# Patient Record
Sex: MALE | Race: WHITE | URBAN - METROPOLITAN AREA
[De-identification: names, ages, dates, MRNs, and addresses within clinical notes are randomized per-mention and may not be internally consistent; named-entity substitution may affect disease eponyms.]

---

## 2024-02-13 ENCOUNTER — OFFICE VISIT (OUTPATIENT)
Dept: DERMATOLOGY CLINIC | Facility: CLINIC | Age: 69
End: 2024-02-13

## 2024-02-13 DIAGNOSIS — D49.2 NEOPLASM OF UNSPECIFIED BEHAVIOR OF BONE, SOFT TISSUE, AND SKIN: ICD-10-CM

## 2024-02-13 DIAGNOSIS — L72.9 CYST OF SKIN: Primary | ICD-10-CM

## 2024-02-13 PROCEDURE — 88305 TISSUE EXAM BY PATHOLOGIST: CPT | Performed by: STUDENT IN AN ORGANIZED HEALTH CARE EDUCATION/TRAINING PROGRAM

## 2024-02-13 PROCEDURE — 11102 TANGNTL BX SKIN SINGLE LES: CPT | Performed by: STUDENT IN AN ORGANIZED HEALTH CARE EDUCATION/TRAINING PROGRAM

## 2024-02-13 PROCEDURE — 99202 OFFICE O/P NEW SF 15 MIN: CPT | Performed by: STUDENT IN AN ORGANIZED HEALTH CARE EDUCATION/TRAINING PROGRAM

## 2024-02-13 RX ORDER — TAMSULOSIN HYDROCHLORIDE 0.4 MG/1
0.4 CAPSULE ORAL
COMMUNITY

## 2024-02-13 RX ORDER — RUFINAMIDE 40 MG/ML
1 SUSPENSION ORAL DAILY
COMMUNITY

## 2024-02-13 NOTE — PROGRESS NOTES
New Patient    Referred by: No referring provider defined for this encounter.    CHIEF COMPLAINT: Spot of concern    HISTORY OF PRESENT ILLNESS: William Dhillon is a 69 year old male here for evaluation of spot of concern.    Spot of concern  Location: Left hip  Duration: 6 months  Signs and symptoms: started off itchy then turned into a bump  Current treatment: None      2. Spot of concern  Location: Left wrist   Duration: 35 + yrs  Signs and symptoms: started as a blemish and now open wound/bleeds then becomes thick scab  Current treatment: Aloe lotion      Personal Dermatologic History  History of skin cancer: No  History of  atypical moles: No    FAMILY HISTORY:  History of melanoma: No    Past Medical History  No past medical history on file.    REVIEW OF SYSTEMS:  Constitutional: Denies fever, chills, unintentional weight loss.   Skin as per HPI    Medications  No current outpatient medications on file.       PHYSICAL EXAM:  General: awake, alert, no acute distress  Neuropsych: appropriate mood and affect  Eyes: Sclerae anicteric, without conjunctival injection, eyelids unremarkable  Skin: Skin exam was performed today including the following: wrist and hip. Pertinent findings include:   - L lateral hip with cystic nodule  - L wrist with ulcerated nodule    ASSESSMENT & PLAN:  Pathophysiology of diagnoses discussed with patient.  Therapeutic options reviewed. Risks, benefits, and alternatives discussed with patient. Instructions reviewed at length.    #Neoplasm(s) of uncertain behavior of skin  - Shave biopsy performed today   - Will notify patient with results and arrange for appropriate definitive treatment, if indicated.      Shave of lesion to establish and confirm diagnosis:  Photo taken: Yes    Risks, benefits, alternatives and personnel required for shave biopsy reviewed with patient. Risks discussed include, but not limited to: pain, bleeding, infection, scar, reaction to anesthetic, and recurrence/need  for further treatment.  Patient and physician agree as to site(s) to be biopsied. Patient verbalizes understanding and wishes to proceed.     Site(s) prepped with alcohol and anesthetized with 1% lidocaine with epinephrine.   Shave of lesion(s) performed to the level of the dermis. Specimen(s) from A. L wrist  sent for pathology to r/o BCC 50% ALCL and bandaging applied.   Written and verbal wound care instructions provided to patient, understanding verbalized.    #Cyst, L hip  The note was routed electronically to the referring physician.     Return to clinic: 1 month or sooner if something concerning arises     Jesse Anderson MD

## 2024-02-19 ENCOUNTER — TELEPHONE (OUTPATIENT)
Dept: DERMATOLOGY CLINIC | Facility: CLINIC | Age: 69
End: 2024-02-19

## 2024-02-20 NOTE — TELEPHONE ENCOUNTER
Dr. Anderson - pt seen 2/13/23, biopsy done to left wrist - benign keratosis, pt requests additional treatment even though it's benign, states lesion is scabby, falls off, becomes red, raw looking. Wants to know if you can freeze it at some point? States he will proceed with surgeons in [place] to have this treated if we can't offer him any tx here.

## 2024-02-28 ENCOUNTER — OFFICE VISIT (OUTPATIENT)
Dept: DERMATOLOGY CLINIC | Facility: CLINIC | Age: 69
End: 2024-02-28
Payer: MEDICARE

## 2024-02-28 DIAGNOSIS — D48.5 NEOPLASM OF UNCERTAIN BEHAVIOR OF SKIN: Primary | ICD-10-CM

## 2024-02-28 PROCEDURE — 11102 TANGNTL BX SKIN SINGLE LES: CPT | Performed by: STUDENT IN AN ORGANIZED HEALTH CARE EDUCATION/TRAINING PROGRAM

## 2024-02-28 PROCEDURE — 88305 TISSUE EXAM BY PATHOLOGIST: CPT | Performed by: STUDENT IN AN ORGANIZED HEALTH CARE EDUCATION/TRAINING PROGRAM

## 2024-02-28 NOTE — PROGRESS NOTES
Established Patient    Referred by: No referring provider defined for this encounter.    CHIEF COMPLAINT: Lesion of concern     HISTORY OF PRESENT ILLNESS: William Dhillon is a 69 year old male here for evaluation of lesion of concern.    Growth   Location: Left wrist   Duration: 35 + yrs  Signs and symptoms: started as a blemish and now open wound/bleeds then becomes thick scab  Current treatment: Aloe lotion    Personal Dermatologic History  History of skin cancer: No  History of  atypical moles: No    FAMILY HISTORY:  History of melanoma: No    Past Medical History  History reviewed. No pertinent past medical history.    REVIEW OF SYSTEMS:  Constitutional: Denies fever, chills, unintentional weight loss.   Skin as per HPI    Medications  Current Outpatient Medications   Medication Sig Dispense Refill    tamsulosin 0.4 MG Oral Cap Take 1 capsule (0.4 mg total) by mouth After dinner.      multivitamin Oral Chew Tab Chew 1 tablet by mouth daily.         PHYSICAL EXAM:  General: awake, alert, no acute distress  Neuropsych: appropriate mood and affect  Eyes: Sclerae anicteric, without conjunctival injection, eyelids unremarkable  Skin: Skin exam was performed today including the following: L wrist . Pertinent findings include:   - with ulcerated nodule    ASSESSMENT & PLAN:  Pathophysiology of diagnoses discussed with patient.  Therapeutic options reviewed. Risks, benefits, and alternatives discussed with patient. Instructions reviewed at length.    #Neoplasm(s) of uncertain behavior of skin  - Shave biopsy performed today   - Will notify patient with results and arrange for appropriate definitive treatment, if indicated.      Shave of lesion to establish and confirm diagnosis:  Photo taken: Yes    Risks, benefits, alternatives and personnel required for shave biopsy reviewed with patient. Risks discussed include, but not limited to: pain, bleeding, infection, scar, reaction to anesthetic, and recurrence/need for further  treatment.  Patient and physician agree as to site(s) to be biopsied. Patient verbalizes understanding and wishes to proceed.     Site(s) prepped with alcohol and anesthetized with 1% lidocaine with epinephrine.   Shave of lesion(s) performed to the level of the dermis. Specimen(s) from A. L wrist  sent for pathology to r/o BCC 50% ALCL and bandaging applied.   Written and verbal wound care instructions provided to patient, understanding verbalized.    Base curetted and cauterized in case of inflamed keratosis.     Return to clinic: for FBSE or sooner if something concerning arises     Jesse Anderson MD

## 2024-03-04 ENCOUNTER — TELEPHONE (OUTPATIENT)
Dept: DERMATOLOGY CLINIC | Facility: CLINIC | Age: 69
End: 2024-03-04

## 2024-03-04 NOTE — TELEPHONE ENCOUNTER
Called patient. Informed him of results and recommend MOHS surgery with OSD. Patient agreeable with plan.

## 2024-03-22 ENCOUNTER — MED REC SCAN ONLY (OUTPATIENT)
Dept: DERMATOLOGY CLINIC | Facility: CLINIC | Age: 69
End: 2024-03-22

## 2024-03-26 ENCOUNTER — OFFICE VISIT (OUTPATIENT)
Dept: SURGERY | Facility: CLINIC | Age: 69
End: 2024-03-26

## 2024-03-26 DIAGNOSIS — R82.90 URINE FINDING: Primary | ICD-10-CM

## 2024-03-26 DIAGNOSIS — R33.9 URINARY RETENTION: ICD-10-CM

## 2024-03-26 LAB
APPEARANCE: CLEAR
BILIRUBIN: NEGATIVE
GLUCOSE (URINE DIPSTICK): 250 MG/DL
KETONES (URINE DIPSTICK): NEGATIVE MG/DL
MULTISTIX LOT#: ABNORMAL NUMERIC
NITRITE, URINE: POSITIVE
PH, URINE: 6 (ref 4.5–8)
PROTEIN (URINE DIPSTICK): >=300 MG/DL
SPECIFIC GRAVITY: 1.02 (ref 1–1.03)
URINE-COLOR: YELLOW
UROBILINOGEN,SEMI-QN: 0.2 MG/DL (ref 0–1.9)

## 2024-03-26 PROCEDURE — 51798 US URINE CAPACITY MEASURE: CPT | Performed by: SURGERY

## 2024-03-26 PROCEDURE — 81003 URINALYSIS AUTO W/O SCOPE: CPT | Performed by: SURGERY

## 2024-03-26 PROCEDURE — 99204 OFFICE O/P NEW MOD 45 MIN: CPT | Performed by: SURGERY

## 2024-03-26 RX ORDER — SULFAMETHOXAZOLE AND TRIMETHOPRIM 800; 160 MG/1; MG/1
1 TABLET ORAL 2 TIMES DAILY
Qty: 4 TABLET | Refills: 0 | Status: SHIPPED | OUTPATIENT
Start: 2024-03-26 | End: 2024-03-28

## 2024-03-26 RX ORDER — TAMSULOSIN HYDROCHLORIDE 0.4 MG/1
0.8 CAPSULE ORAL EVERY EVENING
Qty: 180 CAPSULE | Refills: 6 | Status: SHIPPED | OUTPATIENT
Start: 2024-03-26

## 2024-03-26 NOTE — PROGRESS NOTES
Urology Clinic Note - New Patient    Referring Provider:  No referring provider defined for this encounter.     Primary Care Provider:  No primary care provider on file.     Chief Complaint:   BPH/LUTS    HPI:   William Dhillon is a 69 year old male with history of OA, hypertension referred for BPH/LUTS.  He was seen by a urologist in [place] back in 2021 and started on tamsulosin for BPH symptoms.    He feels that the tamsulosin helps him and it helps even more when he was on tamsulosin 0.8 mg daily.  He has since run out of his prescription.  He denies bothersome urinary urgency, frequency, gross hematuria.    PVR at prior urology visits around 200 mL.  Up to 335 mL today.    Denies family history of prostate or other cancers.    AUA symptom score is 9/3 with LUTS.    Post-void residual bladder scan: 335 mL    Urinalysis (clinic dip): Glucose 250, large blood, greater than 300 protein, positive nitrates, large leukocyte esterase    PSA:  No results found for: \"PSA\", \"PERCENTPSA\", \"PSAS\", \"PSAULTRA\", \"QPSA\", \"PSATOT\", \"TOTPSADX\", \"TOTPSASCREEN\"     History:   No past medical history on file.    Past Surgical History:   Procedure Laterality Date    CATARACT  2023    COLONOSCOPY  2023    Results were good       No family history on file.    Social History     Socioeconomic History    Marital status:    Tobacco Use    Smoking status: Never    Smokeless tobacco: Never   Substance and Sexual Activity    Alcohol use: Yes     Alcohol/week: 6.0 standard drinks of alcohol     Types: 6 Cans of beer per week     Comment: Number button not working. 6 beers is correct #    Drug use: Never   Other Topics Concern    Grew up on a farm No    History of tanning No    Outdoor occupation No    Reaction to local anesthetic No    Pt has a pacemaker No    Pt has a defibrillator No       Medications (Active prior to today's visit):  Current Outpatient Medications   Medication Sig Dispense Refill    tamsulosin 0.4 MG Oral Cap Take 1  capsule (0.4 mg total) by mouth After dinner.      multivitamin Oral Chew Tab Chew 1 tablet by mouth daily.         Allergies:  Not on File      Review of Systems:   A comprehensive 10-point review of systems was completed.  Pertinent positives and negatives are noted in the the HPI.    Physical Exam:   CONSTITUTIONAL: Well developed, well nourished, in no acute distress  NEUROLOGIC: Alert and oriented  HEAD: Normocephalic, atraumatic  EYES: Sclera non-icteric  ENT: Hearing intact, moist mucous membranes  NECK: No obvious goiter or masses  RESPIRATORY: Normal respiratory effort  SKIN: No evident rashes  ABDOMEN: Soft, non-tender, non-distended  GENITOURINARY: Normal phallus, orthotopic meatus, normal bilateral testicles  DIGITAL RECTAL EXAM: ~50 gram prostate, no nodules or tenderness    Assessment & Plan:   William Dhillon is a 69 year old male with history of OA, hypertension referred for BPH/LUTS.  He was seen by a urologist in [place] back in 2021 and started on tamsulosin for BPH symptoms.    He feels that the tamsulosin helps him and it helps even more when he was on tamsulosin 0.8 mg daily.  He has since run out of his prescription.  He denies bothersome urinary urgency, frequency, gross hematuria.    PVR at prior urology visits around 200 mL.  Up to 335 mL today.    Denies family history of prostate or other cancers.    AUA symptom score is 9/3 with LUTS.    Post-void residual bladder scan: 335 mL    -Restart tamsulosin 0.8 mg daily  -Return to clinic in 2 months for symptom check and repeat PVR  -Can consider adding finasteride and/or cystoscopy if still not emptying and symptoms persist  -Prescribed Bactrim empirically, follow-up urine culture and change antibiotics if necessary for presumed UTI  -PSA at next visit once UTI fully treated      Thank you for this consult.    I have personally reviewed all relevant medical records, labs, and imaging.    Medical Decision Making  BPH/LUTS: Undiagnosed new  problem  Urinary tract infection: Undiagnosed new problem    Amount and/or Complexity of Data Reviewed  External Data Reviewed: labs and notes.  Labs: ordered.    Risk  Prescription drug management.          Kavon Smith MD  Staff Urologist  [facility]  Office: [phone]

## 2024-03-28 RX ORDER — CIPROFLOXACIN 500 MG/1
500 TABLET, FILM COATED ORAL 2 TIMES DAILY
Qty: 14 TABLET | Refills: 0 | Status: SHIPPED | OUTPATIENT
Start: 2024-03-28 | End: 2024-04-04

## 2024-06-03 ENCOUNTER — TELEPHONE (OUTPATIENT)
Dept: SURGERY | Facility: CLINIC | Age: 69
End: 2024-06-03

## 2024-06-03 NOTE — TELEPHONE ENCOUNTER
Patient requesting a refill of the tamsulosin medication. He had enough until his 6/25 appointment but he had to reschedule this appointment to 7/26. Patient will need a refill to have enough medication till this new appointment. Please advise.    Current Outpatient Medications:     tamsulosin 0.4 MG Oral Cap, Take 2 capsules (0.8 mg total) by mouth every evening., Disp: 180

## 2024-08-27 ENCOUNTER — HOSPITAL ENCOUNTER (OUTPATIENT)
Dept: ULTRASOUND IMAGING | Age: 69
Discharge: HOME | End: 2024-08-27
Attending: SURGERY
Payer: MEDICARE

## 2024-08-27 DIAGNOSIS — R33.9 URINARY RETENTION: ICD-10-CM

## 2024-08-27 PROCEDURE — 76770 US EXAM ABDO BACK WALL COMP: CPT | Performed by: SURGERY

## 2024-08-28 NOTE — PROGRESS NOTES
Renal US 8/27/24 shows marked right hydronephrosis with cortical thinning, left extrarenal pelvis, no stones, bladder  cc. Prostate volume 74.2 cc.    Future Appointments  9/10/2024  11:45 AM   Kavon Smith MD           [id number]           [facility]

## 2024-09-03 ENCOUNTER — TELEPHONE (OUTPATIENT)
Dept: SURGERY | Facility: CLINIC | Age: 69
End: 2024-09-03

## 2024-09-03 DIAGNOSIS — N40.1 BPH LOC W URIN OBS/LUTS: Primary | ICD-10-CM

## 2024-09-03 DIAGNOSIS — N13.39 OTHER HYDRONEPHROSIS: ICD-10-CM

## 2024-09-03 NOTE — TELEPHONE ENCOUNTER
Patient has follow up appointment scheduled 9/10. Patient asking if any further test our required based on his ultra sound results. Please send reply through patients mychart, thanks.

## 2024-09-04 NOTE — TELEPHONE ENCOUNTER
RN called patient. Concern of his US result and wondering if MD will be ordering or is there a need for a CT imaging. He has appt on 9/10 and due to distance, he wishes MD to give order now (a need of CT?) before his appointment. Ok to discuss result on appt day.

## 2024-09-04 NOTE — TELEPHONE ENCOUNTER
This encounter is now closed.     RN called patient and updated re: CT order and to call Central Scheduling  Patient thankful and agreed to plans.

## 2024-09-23 ENCOUNTER — LAB ENCOUNTER (OUTPATIENT)
Dept: LAB | Age: 69
End: 2024-09-23
Attending: SURGERY
Payer: MEDICARE

## 2024-09-23 ENCOUNTER — HOSPITAL ENCOUNTER (OUTPATIENT)
Dept: CT IMAGING | Age: 69
Discharge: HOME | End: 2024-09-23
Attending: SURGERY
Payer: MEDICARE

## 2024-09-23 DIAGNOSIS — N40.1 BPH LOC W URIN OBS/LUTS: ICD-10-CM

## 2024-09-23 DIAGNOSIS — N13.39 OTHER HYDRONEPHROSIS: ICD-10-CM

## 2024-09-23 LAB
CREAT BLD-MCNC: 1 MG/DL
EGFRCR SERPLBLD CKD-EPI 2021: 82 ML/MIN/1.73M2 (ref 60–?)

## 2024-09-23 PROCEDURE — 74178 CT ABD&PLV WO CNTR FLWD CNTR: CPT | Performed by: SURGERY

## 2024-09-23 PROCEDURE — 84154 ASSAY OF PSA FREE: CPT

## 2024-09-23 PROCEDURE — 36415 COLL VENOUS BLD VENIPUNCTURE: CPT

## 2024-09-23 PROCEDURE — 84153 ASSAY OF PSA TOTAL: CPT

## 2024-09-23 PROCEDURE — 76377 3D RENDER W/INTRP POSTPROCES: CPT | Performed by: SURGERY

## 2024-09-23 PROCEDURE — 82565 ASSAY OF CREATININE: CPT

## 2024-09-24 ENCOUNTER — OFFICE VISIT (OUTPATIENT)
Dept: SURGERY | Facility: CLINIC | Age: 69
End: 2024-09-24
Payer: MEDICARE

## 2024-09-24 DIAGNOSIS — Q62.39 UPJ OBSTRUCTION, CONGENITAL: ICD-10-CM

## 2024-09-24 DIAGNOSIS — N40.1 BPH LOC W URIN OBS/LUTS: Primary | ICD-10-CM

## 2024-09-24 LAB
%FPSA REFLEX: 13.8 %
%FPSA REFLEX: 13.8 %
APPEARANCE: CLEAR
BILIRUBIN: NEGATIVE
GLUCOSE (URINE DIPSTICK): NEGATIVE MG/DL
KETONES (URINE DIPSTICK): NEGATIVE MG/DL
LEUKOCYTES: NEGATIVE
MULTISTIX LOT#: NORMAL NUMERIC
NITRITE, URINE: NEGATIVE
OCCULT BLOOD: NEGATIVE
PH, URINE: 6 (ref 4.5–8)
PROSTATE SPECIFIC AG: 4.2 NG/ML
PROSTATE SPECIFIC AG: 4.2 NG/ML
PROTEIN (URINE DIPSTICK): NEGATIVE MG/DL
PSA, FREE: 0.58 NG/ML
PSA, FREE: 0.58 NG/ML
SPECIFIC GRAVITY: 1.02 (ref 1–1.03)
URINE-COLOR: YELLOW
UROBILINOGEN,SEMI-QN: 0.2 MG/DL (ref 0–1.9)

## 2024-09-24 PROCEDURE — 99215 OFFICE O/P EST HI 40 MIN: CPT | Performed by: SURGERY

## 2024-09-24 PROCEDURE — 81003 URINALYSIS AUTO W/O SCOPE: CPT | Performed by: SURGERY

## 2024-09-24 PROCEDURE — 51798 US URINE CAPACITY MEASURE: CPT | Performed by: SURGERY

## 2024-09-24 RX ORDER — TAMSULOSIN HYDROCHLORIDE 0.4 MG/1
0.8 CAPSULE ORAL EVERY EVENING
Qty: 180 CAPSULE | Refills: 6 | Status: SHIPPED | OUTPATIENT
Start: 2024-09-24

## 2024-09-24 RX ORDER — FINASTERIDE 5 MG/1
5 TABLET, FILM COATED ORAL DAILY
Qty: 90 TABLET | Refills: 6 | Status: SHIPPED | OUTPATIENT
Start: 2024-09-24

## 2024-09-24 NOTE — PROGRESS NOTES
Urology Clinic Note    Primary Care Provider:  No primary care provider on file.     Chief Complaint:   BPH/LUTS, hydronephrosis     HPI:   William Dhillon is a 69 year old male with history of OA, hypertension referred for BPH/LUTS.  He was seen by a urologist in [place] back in 2021 and started on tamsulosin for BPH symptoms.     He feels that the tamsulosin helps him and it helps even more when he was on tamsulosin 0.8 mg daily.  He has since run out of his prescription.  He denies bothersome urinary urgency, frequency, gross hematuria.     PVR at prior urology visits around 200 mL.  Up to 335 mL last visit.  I restarted tamsulosin 0.8 mg daily last visit.  I also treated him for an E. coli UTI on 3/26/2024.    PSA on 9/23/2024 was 4.2 (13.8% free). No priors for comparison.  This renal ultrasound 8/27/2024 showed marked right hydronephrosis with cortical thinning, left extrarenal pelvis, no stones, bladder  cc. Prostate volume 74.2 cc.  I ordered a follow-up CT urogram performed on 9/23/2024 and this showed severe right hydronephrosis with cortical thinning likely related to chronic UPJ obstruction.  Mild left hydronephrosis is also noted suggesting mild left UPJ obstruction.  He has an enlarged prostate with mild bladder thickening.  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    He is not having any significant right flank pain at this time.  He does note occasional right back pain that could be related that he has had over his lifetime.  He endorses good urinary stream and his PVR is slightly improved today.    No prior abdominal surgeries.    Most recent creatinine 1.0.    AUA symptom score is 11/2 with LUTS.    Post-void residual bladder scan: 201 mL    Urinalysis: Negative    PSA:  Lab Results   Component Value Date    PSA 4.2 (H) 09/23/2024        History:   No past medical history on file.    Past  Surgical History:   Procedure Laterality Date    Cataract  2023    Colonoscopy  2023    Results were good       No family history on file.    Social History     Socioeconomic History    Marital status:    Tobacco Use    Smoking status: Never    Smokeless tobacco: Never   Substance and Sexual Activity    Alcohol use: Yes     Alcohol/week: 6.0 standard drinks of alcohol     Types: 6 Cans of beer per week     Comment: Number button not working. 6 beers is correct #    Drug use: Never   Other Topics Concern    Grew up on a farm No    History of tanning No    Outdoor occupation No    Reaction to local anesthetic No    Pt has a pacemaker No    Pt has a defibrillator No       Medications (Active prior to today's visit):  Current Outpatient Medications   Medication Sig Dispense Refill    tamsulosin 0.4 MG Oral Cap Take 2 capsules (0.8 mg total) by mouth every evening. 180 capsule 6    multivitamin Oral Chew Tab Chew 1 tablet by mouth daily.         Allergies:  No Known Allergies    Review of Systems:   A comprehensive 10-point review of systems was completed.  Pertinent positives and negatives are noted in the the HPI.    Physical Exam:   CONSTITUTIONAL: Well developed, well nourished, in no acute distress  NEUROLOGIC: Alert and oriented  HEAD: Normocephalic, atraumatic  EYES: Sclera non-icteric  ENT: Hearing intact, moist mucous membranes  NECK: No obvious goiter or masses  RESPIRATORY: Normal respiratory effort  SKIN: No evident rashes  ABDOMEN: Soft, non-tender, non-distended    Assessment & Plan:   William Dhillon is a 69 year old male with history of OA, hypertension referred for BPH/LUTS.  He was seen by a urologist in [place] back in 2021 and started on tamsulosin for BPH symptoms.     He feels that the tamsulosin helps him and it helps even more when he was on tamsulosin 0.8 mg daily.  He has since run out of his prescription.  He denies bothersome urinary urgency, frequency, gross hematuria.     PVR at prior  urology visits around 200 mL.  Up to 335 mL last visit.  I restarted tamsulosin 0.8 mg daily last visit.  I also treated him for an E. coli UTI on 3/26/2024.    PSA on 9/23/2024 was 4.2 (13.8% free). No priors for comparison.  This renal ultrasound 8/27/2024 showed marked right hydronephrosis with cortical thinning, left extrarenal pelvis, no stones, bladder  cc. Prostate volume 74.2 cc.  I ordered a follow-up CT urogram performed on 9/23/2024 and this showed severe right hydronephrosis with cortical thinning likely related to chronic UPJ obstruction.  Mild left hydronephrosis is also noted suggesting mild left UPJ obstruction.  He has an enlarged prostate with mild bladder thickening.  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    He is not having any significant right flank pain at this time.  He does note occasional right back pain that could be related that he has had over his lifetime.  He endorses good urinary stream and his PVR is slightly improved today.    No prior abdominal surgeries.    Most recent creatinine 1.0.    -Lasix renal scan  -Continue tamsulosin  -Start finasteride  -Discussed pyeloplasty versus nephrectomy based on results of renal scan  -Will need to recheck PSA 6 months after starting Finasteride    In total, 40 minutes were spent on this patient encounter (including chart review, patient history, physical, and counseling, documentation, and communication).    Kavon Smith MD  Staff Urologist  [facility]  Office: [phone]

## 2024-10-07 ENCOUNTER — HOSPITAL ENCOUNTER (OUTPATIENT)
Dept: NUCLEAR MEDICINE | Facility: HOSPITAL | Age: 69
Discharge: HOME | End: 2024-10-07
Attending: SURGERY
Payer: MEDICARE

## 2024-10-07 DIAGNOSIS — Q62.39 UPJ OBSTRUCTION, CONGENITAL: ICD-10-CM

## 2024-10-07 PROCEDURE — 78708 K FLOW/FUNCT IMAGE W/DRUG: CPT | Performed by: SURGERY

## 2024-10-07 RX ORDER — FUROSEMIDE 10 MG/ML
INJECTION INTRAMUSCULAR; INTRAVENOUS
Status: DISCONTINUED
Start: 2024-10-07 | End: 2024-10-08

## 2024-10-07 RX ORDER — FUROSEMIDE 40 MG
40 TABLET ORAL DAILY
Status: DISCONTINUED | OUTPATIENT
Start: 2024-10-07 | End: 2024-10-09

## 2024-10-07 RX ADMIN — FUROSEMIDE 40 MG: 40 MG TABLET ORAL at 13:30:00

## 2024-11-05 ENCOUNTER — OFFICE VISIT (OUTPATIENT)
Dept: SURGERY | Facility: CLINIC | Age: 69
End: 2024-11-05
Payer: MEDICARE

## 2024-11-05 DIAGNOSIS — N13.39 OTHER HYDRONEPHROSIS: Primary | ICD-10-CM

## 2024-11-05 PROCEDURE — 99215 OFFICE O/P EST HI 40 MIN: CPT | Performed by: SURGERY

## 2024-11-05 NOTE — PROGRESS NOTES
Urology Clinic Note    Primary Care Provider:  No primary care provider on file.     Chief Complaint:   BPH/LUTS, hydronephrosis     HPI:   William Dhillon is a 69 year old male with history of OA, hypertension referred for BPH/LUTS.  He was seen by a urologist in [place] back in 2021 and started on tamsulosin for BPH/LUTS.     He feels that the tamsulosin helps him and it helps even more when he was on tamsulosin 0.8 mg daily.  He has since run out of his prescription.  He denies bothersome urinary urgency, frequency, gross hematuria.     PVR at prior urology visits around 200 mL.  Up to 335 mL last visit.  I restarted tamsulosin 0.8 mg daily last visit.  I also treated him for an E. coli UTI on 3/26/2024.     PSA on 9/23/2024 was 4.2 (13.8% free). No priors for comparison.  Renal ultrasound 8/27/2024 showed marked right hydronephrosis with cortical thinning, left extrarenal pelvis, no stones, bladder  cc. Prostate volume 74.2 cc.  I ordered a follow-up CT urogram performed on 9/23/2024 and this showed severe right hydronephrosis with cortical thinning likely related to chronic UPJ obstruction.  Mild left hydronephrosis is also noted suggesting mild left UPJ obstruction.  He has an enlarged prostate with mild bladder thickening.  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     He is not having any significant right flank pain at this time.  He does note occasional right back pain that could be related that he has had over his lifetime.  He endorses good urinary stream and his PVR is slightly improved today.  Started on finasteride 9/24/2024.    Lasix renal scan 10/7/2024 showed split function 74% left, 26% right (though it does not take up much radiotracer at all and I think this may be an overestimate based on how his kidney looks on CT). T1/2 after Lasix 4.8 min left, no significant excretion from the right kidney  consistent with high grade obstruction.     No prior abdominal surgeries.     Most recent creatinine 1.0.    Feeling well overall.  No recent UTIs.  Occasional minor right flank pain.  Discussed options of surveillance if no UTIs, significant flank pain, decreasing renal function vs. cystoscopy/stent/possible dilation and ureteroscopy versus reconstructive surgery versus nephrectomy.  He is probably most interested in nephrectomy and we think this is reasonable option based on normal kidney function and atrophic, severely hydronephrotic kidney.    PSA:  Lab Results   Component Value Date    PSA 4.2 (H) 09/23/2024        History:   No past medical history on file.    Past Surgical History:   Procedure Laterality Date    Cataract  2023    Colonoscopy  2023    Results were good       No family history on file.    Social History     Socioeconomic History    Marital status:    Tobacco Use    Smoking status: Never    Smokeless tobacco: Never   Substance and Sexual Activity    Alcohol use: Yes     Alcohol/week: 6.0 standard drinks of alcohol     Types: 6 Cans of beer per week     Comment: Number button not working. 6 beers is correct #    Drug use: Never   Other Topics Concern    Grew up on a farm No    History of tanning No    Outdoor occupation No    Reaction to local anesthetic No    Pt has a pacemaker No    Pt has a defibrillator No       Medications (Active prior to today's visit):  Current Outpatient Medications   Medication Sig Dispense Refill    finasteride 5 MG Oral Tab Take 1 tablet (5 mg total) by mouth daily. 90 tablet 6    tamsulosin 0.4 MG Oral Cap Take 2 capsules (0.8 mg total) by mouth every evening. 180 capsule 6    multivitamin Oral Chew Tab Chew 1 tablet by mouth daily.         Allergies:  Allergies[1]    Review of Systems:   A comprehensive 10-point review of systems was completed.  Pertinent positives and negatives are noted in the the HPI.    Physical Exam:   CONSTITUTIONAL: Well developed,  well nourished, in no acute distress  NEUROLOGIC: Alert and oriented  HEAD: Normocephalic, atraumatic  EYES: Sclera non-icteric  ENT: Hearing intact, moist mucous membranes  NECK: No obvious goiter or masses  RESPIRATORY: Normal respiratory effort  SKIN: No evident rashes  ABDOMEN: Soft, non-tender, non-distended    Assessment & Plan:   William Dhillon is a 69 year old male with history of OA, hypertension referred for BPH/LUTS.  He was seen by a urologist in [place] back in 2021 and started on tamsulosin for BPH/LUTS.     He feels that the tamsulosin helps him and it helps even more when he was on tamsulosin 0.8 mg daily.  He has since run out of his prescription.  He denies bothersome urinary urgency, frequency, gross hematuria.     PVR at prior urology visits around 200 mL.  Up to 335 mL last visit.  I restarted tamsulosin 0.8 mg daily last visit.  I also treated him for an E. coli UTI on 3/26/2024.     PSA on 9/23/2024 was 4.2 (13.8% free). No priors for comparison.  Renal ultrasound 8/27/2024 showed marked right hydronephrosis with cortical thinning, left extrarenal pelvis, no stones, bladder  cc. Prostate volume 74.2 cc.  I ordered a follow-up CT urogram performed on 9/23/2024 and this showed severe right hydronephrosis with cortical thinning likely related to chronic UPJ obstruction.  Mild left hydronephrosis is also noted suggesting mild left UPJ obstruction.  He has an enlarged prostate with mild bladder thickening.  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     He is not having any significant right flank pain at this time.  He does note occasional right back pain that could be related that he has had over his lifetime.  He endorses good urinary stream and his PVR is slightly improved today.  Started on finasteride 9/24/2024.    Lasix renal scan 10/7/2024 showed split function 74% left, 26% right (though  it does not take up much radiotracer at all and I think this may be an overestimate based on how his kidney looks on CT). T1/2 after Lasix 4.8 min left, no significant excretion from the right kidney consistent with high grade obstruction.     No prior abdominal surgeries.     Most recent creatinine 1.0.    Feeling well overall.  No recent UTIs.  Occasional minor right flank pain.  Discussed options of surveillance if no UTIs, significant flank pain, decreasing renal function vs. cystoscopy/stent/possible dilation and ureteroscopy versus reconstructive surgery versus nephrectomy.  He is probably most interested in nephrectomy and we think this is reasonable option based on normal kidney function and atrophic, severely hydronephrotic kidney.    -Return to clinic in 2 months and patient will think about his options between now and then  -Patient is considering robotic nephrectomy and he will message us if he decides that he wants to schedule this    In total, 40 minutes were spent on this patient encounter (including chart review, patient history, physical, and counseling, documentation, and communication).    Kavon Smith MD  Staff Urologist  [facility]  Office: [phone]             [1] No Known Allergies

## 2024-11-08 ENCOUNTER — TELEPHONE (OUTPATIENT)
Dept: SURGERY | Facility: CLINIC | Age: 69
End: 2024-11-08

## 2024-11-08 NOTE — TELEPHONE ENCOUNTER
Hi,    Can you please schedule this patient for surgery.    Can you arrange for this patient to have a CBC, BMP, PT/INR, type and screen, urine culture 2 weeks prior to surgery?    Order 2 units of blood type and cross on call to OR.    Thanks,  MB    Urology Surgery Request  Surgeon: Kavon Smith  Location (if known): [facility]  Procedure: Robot-assisted laparoscopic RIGHT radical nephrectomy  Anesthesia: General   Time Frame: Jan or Feb 2025  Time required: 180 minutes  Diagnosis: Renal mass  Special Equipment: Robot, intraoperative US    Antibiotics: per hospital protocol unless checked below   ___ Levaquin 500 mg IV   ___ Gemcitabine 2 g/100 mL NS bladder instillation to be given in OR    Estimated Post Op/Follow Up Appt:   2 weeks for post-op visit. Please schedule appointment at time of surgery scheduling.

## 2024-12-10 ENCOUNTER — OFFICE VISIT (OUTPATIENT)
Dept: FAMILY MEDICINE CLINIC | Facility: CLINIC | Age: 69
End: 2024-12-10
Payer: MEDICARE

## 2024-12-10 VITALS
BODY MASS INDEX: 26.41 KG/M2 | SYSTOLIC BLOOD PRESSURE: 148 MMHG | HEART RATE: 59 BPM | RESPIRATION RATE: 18 BRPM | WEIGHT: 195 LBS | HEIGHT: 72 IN | DIASTOLIC BLOOD PRESSURE: 88 MMHG | OXYGEN SATURATION: 100 % | TEMPERATURE: 98 F

## 2024-12-10 DIAGNOSIS — H61.23 BILATERAL HEARING LOSS DUE TO CERUMEN IMPACTION: Primary | ICD-10-CM

## 2024-12-10 PROCEDURE — 99203 OFFICE O/P NEW LOW 30 MIN: CPT | Performed by: FAMILY MEDICINE

## 2024-12-10 NOTE — PROGRESS NOTES
William Dhillon is a 69 year old male.    S:  Patient presents today with the following concerns:  Chief Complaint   Patient presents with    Ear Problem     Plugged left ear   Started Sunday, left side    Hx of ear wax build up.  Notes needs to get ears flushed every 2 months or so.  No ear pain.  Feels well otherwise.  He has some questions regarding primary care providers.   He is having his kidney removed next year with Dr. Smith.  Does need some lab work.  He also notes his blood pressure tends to run high.  Moved here from [place].      Current Outpatient Medications   Medication Sig Dispense Refill    finasteride 5 MG Oral Tab Take 1 tablet (5 mg total) by mouth daily. 90 tablet 6    tamsulosin 0.4 MG Oral Cap Take 2 capsules (0.8 mg total) by mouth every evening. 180 capsule 6    multivitamin Oral Chew Tab Chew 1 tablet by mouth daily.       There is no problem list on file for this patient.    No family history on file.    REVIEW OF SYSTEMS:  GENERAL: feels well otherwise  SKIN: denies any unusual skin lesions  EYES:denies vision change  LUNGS: denies shortness of breath with exertion  CARDIOVASCULAR: denies chest pain on exertion  GI: denies abdominal pain.  No N/V/D/C  : denies dysuria  MUSCULOSKELETAL: denies back pain  NEURO: denies headaches    EXAM:  /88   Pulse 59   Temp 97.6 °F (36.4 °C)   Resp 18   Ht 6' (1.829 m)   Wt 195 lb (88.5 kg)   SpO2 100%   BMI 26.45 kg/m²   Physical Exam  Constitutional:       General: He is not in acute distress.     Appearance: Normal appearance. He is not ill-appearing, toxic-appearing or diaphoretic.   HENT:      Head: Normocephalic and atraumatic.      Right Ear: Tympanic membrane and ear canal normal. There is impacted cerumen.      Left Ear: Tympanic membrane and ear canal normal. There is impacted cerumen.      Ears:      Comments: Bilateral cerumen impaction removed with warm water irrigation and curette.  Patient tolerated procedure well.  TM's  visualized and are normal.    Eyes:      Extraocular Movements: Extraocular movements intact.      Conjunctiva/sclera: Conjunctivae normal.      Pupils: Pupils are equal, round, and reactive to light.   Pulmonary:      Effort: Pulmonary effort is normal.   Skin:     General: Skin is warm and dry.   Neurological:      General: No focal deficit present.      Mental Status: He is alert and oriented to person, place, and time.   Psychiatric:         Mood and Affect: Mood normal.         Behavior: Behavior normal.        ASSESSMENT AND PLAN:  William Dhillon is a 69 year old male.  Encounter Diagnosis   Name Primary?    Bilateral hearing loss due to cerumen impaction Yes       No results found.     No orders of the defined types were placed in this encounter.    Meds & Refills for this Visit:  Requested Prescriptions      No prescriptions requested or ordered in this encounter     Imaging & Consults:  None  Patient will return to walk in clinic as needed.    Encouraged him to get established with a primary care provider through [facility].  He can then have a physical and any labs required.     Patient verbalizes understanding of plan.  No follow-ups on file.

## 2025-01-14 ENCOUNTER — TELEPHONE (OUTPATIENT)
Dept: SURGERY | Facility: CLINIC | Age: 70
End: 2025-01-14

## 2025-01-14 NOTE — TELEPHONE ENCOUNTER
Per patient calling to ask if his appointment on 1/21/2025 is still necessary. Please call back.

## 2025-01-15 ENCOUNTER — APPOINTMENT (OUTPATIENT)
Dept: ADMINISTRATIVE | Facility: HOSPITAL | Age: 70
End: 2025-01-15
Payer: MEDICARE

## 2025-01-20 ENCOUNTER — LABORATORY ENCOUNTER (OUTPATIENT)
Dept: LAB | Age: 70
End: 2025-01-20
Attending: SURGERY
Payer: MEDICARE

## 2025-01-20 DIAGNOSIS — Z01.818 PRE-OP TESTING: ICD-10-CM

## 2025-01-20 LAB
ANION GAP SERPL CALC-SCNC: 8 MMOL/L (ref 0–18)
ANTIBODY SCREEN: NEGATIVE
BASOPHILS # BLD AUTO: 0.04 X10(3) UL (ref 0–0.2)
BASOPHILS NFR BLD AUTO: 0.3 %
BUN BLD-MCNC: 22 MG/DL (ref 9–23)
CALCIUM BLD-MCNC: 10.3 MG/DL (ref 8.7–10.6)
CHLORIDE SERPL-SCNC: 106 MMOL/L (ref 98–112)
CO2 SERPL-SCNC: 25 MMOL/L (ref 21–32)
CREAT BLD-MCNC: 0.96 MG/DL
EGFRCR SERPLBLD CKD-EPI 2021: 86 ML/MIN/1.73M2 (ref 60–?)
EOSINOPHIL # BLD AUTO: 0.01 X10(3) UL (ref 0–0.7)
EOSINOPHIL NFR BLD AUTO: 0.1 %
ERYTHROCYTE [DISTWIDTH] IN BLOOD BY AUTOMATED COUNT: 12.7 %
FASTING STATUS PATIENT QL REPORTED: YES
GLUCOSE BLD-MCNC: 96 MG/DL (ref 70–99)
HCT VFR BLD AUTO: 41.6 %
HGB BLD-MCNC: 13.7 G/DL
IMM GRANULOCYTES # BLD AUTO: 0.06 X10(3) UL (ref 0–1)
IMM GRANULOCYTES NFR BLD: 0.5 %
INR BLD: 1.07 (ref 0.8–1.2)
LYMPHOCYTES # BLD AUTO: 1.37 X10(3) UL (ref 1–4)
LYMPHOCYTES NFR BLD AUTO: 10.9 %
MCH RBC QN AUTO: 33 PG (ref 26–34)
MCHC RBC AUTO-ENTMCNC: 32.9 G/DL (ref 31–37)
MCV RBC AUTO: 100.2 FL
MONOCYTES # BLD AUTO: 0.61 X10(3) UL (ref 0.1–1)
MONOCYTES NFR BLD AUTO: 4.8 %
NEUTROPHILS # BLD AUTO: 10.49 X10 (3) UL (ref 1.5–7.7)
NEUTROPHILS # BLD AUTO: 10.49 X10(3) UL (ref 1.5–7.7)
NEUTROPHILS NFR BLD AUTO: 83.4 %
OSMOLALITY SERPL CALC.SUM OF ELEC: 291 MOSM/KG (ref 275–295)
PLATELET # BLD AUTO: 242 10(3)UL (ref 150–450)
POTASSIUM SERPL-SCNC: 4.5 MMOL/L (ref 3.5–5.1)
PROTHROMBIN TIME: 14 SECONDS (ref 11.6–14.8)
RBC # BLD AUTO: 4.15 X10(6)UL
RH BLOOD TYPE: POSITIVE
SODIUM SERPL-SCNC: 139 MMOL/L (ref 136–145)
WBC # BLD AUTO: 12.6 X10(3) UL (ref 4–11)

## 2025-01-20 PROCEDURE — 87086 URINE CULTURE/COLONY COUNT: CPT

## 2025-01-20 PROCEDURE — 86901 BLOOD TYPING SEROLOGIC RH(D): CPT

## 2025-01-20 PROCEDURE — 86900 BLOOD TYPING SEROLOGIC ABO: CPT

## 2025-01-20 PROCEDURE — 85025 COMPLETE CBC W/AUTO DIFF WBC: CPT

## 2025-01-20 PROCEDURE — 80048 BASIC METABOLIC PNL TOTAL CA: CPT

## 2025-01-20 PROCEDURE — 86850 RBC ANTIBODY SCREEN: CPT

## 2025-01-20 PROCEDURE — 85610 PROTHROMBIN TIME: CPT

## 2025-01-20 PROCEDURE — 36415 COLL VENOUS BLD VENIPUNCTURE: CPT

## 2025-01-22 ENCOUNTER — PATIENT MESSAGE (OUTPATIENT)
Dept: SURGERY | Facility: CLINIC | Age: 70
End: 2025-01-22

## 2025-01-22 NOTE — PROGRESS NOTES
Negative urine culture.    [facility] message sent to patient.    Future Appointments  2/20/2025  10:15 AM   Kavon Smith MD           [id number]           [facility]

## 2025-01-31 ENCOUNTER — PATIENT MESSAGE (OUTPATIENT)
Dept: SURGERY | Facility: CLINIC | Age: 70
End: 2025-01-31

## 2025-01-31 NOTE — H&P
UROLOGY PRE-OPERATIVE HISTORY & PHYSICAL      William Dhillon Patient Status:  Outpatient in a Bed    1/3/1955 MRN [id number]   [facility] SURGERY Attending Kavon Smith MD   Hosp Day # 0 PCP No primary care provider on file.     Primary Care Provider: No primary care provider on file.     Chief Complaint:   BPH, hydronephrosis, atrophic kidney    History of Present Illness:   William Dhillon is a 70 year old male with history of OA, hypertension referred for BPH/LUTS.  He was seen by a urologist in [facility] in  and started on tamsulosin for BPH/LUTS.     He feels that the tamsulosin helps him and it helps even more when he was on tamsulosin 0.8 mg daily.  He has since run out of his prescription.  He denies bothersome urinary urgency, frequency, gross hematuria.     PVR at prior urology visits around 200 mL.  Up to 335 mL last visit.  I restarted tamsulosin 0.8 mg daily last visit.  I also treated him for an E. coli UTI on 3/26/2024.     PSA on 2024 was 4.2 (13.8% free). No priors for comparison.  Renal ultrasound 2024 showed marked right hydronephrosis with cortical thinning, left extrarenal pelvis, no stones, bladder  cc. Prostate volume 74.2 cc.  I ordered a follow-up CT urogram performed on 2024 and this showed severe right hydronephrosis with cortical thinning likely related to chronic UPJ obstruction.  Mild left hydronephrosis is also noted suggesting mild left UPJ obstruction.  He has an enlarged prostate with mild bladder thickening.  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     He is not having any significant right flank pain at this time.  He does note occasional right back pain that could be related that he has had over his lifetime.  He endorses good urinary stream and his PVR is slightly improved today.  Started on finasteride 2024.     Lasix renal  scan 10/7/2024 showed split function 74% left, 26% right (though it does not take up much radiotracer at all and I think this may be an overestimate based on how his kidney looks on CT). T1/2 after Lasix 4.8 min left, no significant excretion from the right kidney consistent with high grade obstruction.     No prior abdominal surgeries.     Most recent creatinine 1.0.     Feeling well overall.  No recent UTIs.  Occasional minor right flank pain.  Discussed options of surveillance if no UTIs, significant flank pain, decreasing renal function vs. cystoscopy/stent/possible dilation and ureteroscopy versus reconstructive surgery versus nephrectomy.  He is most interested in nephrectomy and I think this is reasonable option based on normal kidney function and atrophic, severely hydronephrotic kidney.    UCx negative on 1/20/25.    History:     Past Medical History:    Cancer (HCC)    WRIST- BASAL CELL    Renal mass    SURGERY 2/5/25 Right Radical Nephrectomy       Past Surgical History:   Procedure Laterality Date    Cataract  2023    Colonoscopy  2023    Results were good    Other surgical history      5/2024 WRIST MOH'S PROCEDURE       History reviewed. No pertinent family history.    Social History     Socioeconomic History    Marital status:    Tobacco Use    Smoking status: Never    Smokeless tobacco: Never   Vaping Use    Vaping status: Never Used   Substance and Sexual Activity    Alcohol use: Yes     Alcohol/week: 6.0 standard drinks of alcohol     Types: 6 Cans of beer per week     Comment: 2X WEEKLY    Drug use: Never   Other Topics Concern    Grew up on a farm No    History of tanning No    Outdoor occupation No    Reaction to local anesthetic No    Pt has a pacemaker No    Pt has a defibrillator No       Medications:  Current Outpatient Medications   Medication Sig Dispense Refill    Glucosamine-Chondroit-Biofl-Mn (GLUCOSAMINE CHONDROIT,BIOFLAV, OR) Take by mouth before breakfast.      finasteride 5 MG  Oral Tab Take 1 tablet (5 mg total) by mouth daily. (Patient taking differently: Take 1 tablet (5 mg total) by mouth every evening.) 90 tablet 6    tamsulosin 0.4 MG Oral Cap Take 2 capsules (0.8 mg total) by mouth every evening. 180 capsule 6    multivitamin Oral Chew Tab Chew 1 tablet by mouth daily.         Allergies:  Allergies[1]    Review of Systems:   A comprehensive 10-point review of systems was completed.  Pertinent positives and negatives are noted in the the HPI.    Physical Exam:   Vital Signs:  Height 6' (1.829 m), weight 195 lb (88.5 kg).     CONSTITUTIONAL: Well developed, well nourished, in no acute distress  NEUROLOGIC: Alert and oriented  HEAD: Normocephalic, atraumatic  EYES: Sclera non-icteric  ENT: Hearing intact, moist mucous membranes  NECK: No obvious goiter or masses  RESPIRATORY: Normal respiratory effort  SKIN: No evident rashes  ABDOMEN: Soft, non-tender, non-distended    Laboratory Data:  Lab Results   Component Value Date    WBC 12.6 (H) 01/20/2025    HGB 13.7 01/20/2025    .0 01/20/2025     Lab Results   Component Value Date     01/20/2025    K 4.5 01/20/2025     01/20/2025    CO2 25.0 01/20/2025    BUN 22 01/20/2025    GLU 96 01/20/2025    CA 10.3 01/20/2025       Urinalysis Results (last three years):  Recent Labs     03/26/24  1124 09/24/24  0813   SPECGRAVITY 1.020 1.020   PHURINE 6.0 6.0   NITRITE Positive* Negative       Urine Culture Results (last three years):  Lab Results   Component Value Date    URINECUL No Growth 2 Days 01/20/2025    URINECUL >100,000 CFU/ML Escherichia coli (A) 03/26/2024       PSA:  Lab Results   Component Value Date    PSA 4.2 (H) 09/23/2024        Imaging (last three days):  No results found.     Assessment:   William Dhillon is a 70 year old male with history of OA, hypertension referred for BPH/LUTS.  He was seen by a urologist in [place] back in 2021 and started on tamsulosin for BPH/LUTS.     He feels that the tamsulosin helps him  and it helps even more when he was on tamsulosin 0.8 mg daily.  He has since run out of his prescription.  He denies bothersome urinary urgency, frequency, gross hematuria.     PVR at prior urology visits around 200 mL.  Up to 335 mL last visit.  I restarted tamsulosin 0.8 mg daily last visit.  I also treated him for an E. coli UTI on 3/26/2024.     PSA on 9/23/2024 was 4.2 (13.8% free). No priors for comparison.  Renal ultrasound 8/27/2024 showed marked right hydronephrosis with cortical thinning, left extrarenal pelvis, no stones, bladder  cc. Prostate volume 74.2 cc.  I ordered a follow-up CT urogram performed on 9/23/2024 and this showed severe right hydronephrosis with cortical thinning likely related to chronic UPJ obstruction.  Mild left hydronephrosis is also noted suggesting mild left UPJ obstruction.  He has an enlarged prostate with mild bladder thickening.  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     He is not having any significant right flank pain at this time.  He does note occasional right back pain that could be related that he has had over his lifetime.  He endorses good urinary stream and his PVR is slightly improved today.  Started on finasteride 9/24/2024.     Lasix renal scan 10/7/2024 showed split function 74% left, 26% right (though it does not take up much radiotracer at all and I think this may be an overestimate based on how his kidney looks on CT). T1/2 after Lasix 4.8 min left, no significant excretion from the right kidney consistent with high grade obstruction.     No prior abdominal surgeries.     Most recent creatinine 1.0.     Feeling well overall.  No recent UTIs.  Occasional minor right flank pain.  Discussed options of surveillance if no UTIs, significant flank pain, decreasing renal function vs. cystoscopy/stent/possible dilation and ureteroscopy versus reconstructive  surgery versus nephrectomy.  He is most interested in nephrectomy and I think this is reasonable option based on normal kidney function and atrophic, severely hydronephrotic kidney.    Plan:   - OR for robotic RIGHT nephrectomy   - Informed consent obtained - risks and benefits explained, and all questions answered    I have personally reviewed all relevant medical records, labs, and imaging.     Kavon Smith MD  Staff Urologist  [facility]  Office: [phone]             [1] No Known Allergies

## 2025-02-04 ENCOUNTER — ANESTHESIA EVENT (OUTPATIENT)
Dept: SURGERY | Facility: HOSPITAL | Age: 70
End: 2025-02-04
Payer: MEDICARE

## 2025-02-05 ENCOUNTER — HOSPITAL ENCOUNTER (INPATIENT)
Facility: HOSPITAL | Age: 70
LOS: 1 days | Discharge: HOME | End: 2025-02-06
Attending: SURGERY | Admitting: SURGERY
Payer: MEDICARE

## 2025-02-05 ENCOUNTER — ANESTHESIA (OUTPATIENT)
Dept: SURGERY | Facility: HOSPITAL | Age: 70
End: 2025-02-05
Payer: MEDICARE

## 2025-02-05 DIAGNOSIS — Z01.818 PRE-OP TESTING: Primary | ICD-10-CM

## 2025-02-05 DIAGNOSIS — N26.1 ATROPHIC KIDNEY: ICD-10-CM

## 2025-02-05 DIAGNOSIS — N28.89 RENAL MASS: ICD-10-CM

## 2025-02-05 LAB
ANION GAP SERPL CALC-SCNC: 10 MMOL/L (ref 0–18)
BUN BLD-MCNC: 14 MG/DL (ref 9–23)
CALCIUM BLD-MCNC: 9 MG/DL (ref 8.7–10.6)
CHLORIDE SERPL-SCNC: 105 MMOL/L (ref 98–112)
CO2 SERPL-SCNC: 24 MMOL/L (ref 21–32)
CREAT BLD-MCNC: 0.99 MG/DL
EGFRCR SERPLBLD CKD-EPI 2021: 83 ML/MIN/1.73M2 (ref 60–?)
ERYTHROCYTE [DISTWIDTH] IN BLOOD BY AUTOMATED COUNT: 12.5 %
GLUCOSE BLD-MCNC: 135 MG/DL (ref 70–99)
HCT VFR BLD AUTO: 34.8 %
HGB BLD-MCNC: 11.7 G/DL
MCH RBC QN AUTO: 33.1 PG (ref 26–34)
MCHC RBC AUTO-ENTMCNC: 33.6 G/DL (ref 31–37)
MCV RBC AUTO: 98.6 FL
OSMOLALITY SERPL CALC.SUM OF ELEC: 291 MOSM/KG (ref 275–295)
PLATELET # BLD AUTO: 173 10(3)UL (ref 150–450)
POTASSIUM SERPL-SCNC: 3.7 MMOL/L (ref 3.5–5.1)
RBC # BLD AUTO: 3.53 X10(6)UL
RH BLOOD TYPE: POSITIVE
SODIUM SERPL-SCNC: 139 MMOL/L (ref 136–145)
WBC # BLD AUTO: 12.3 X10(3) UL (ref 4–11)

## 2025-02-05 PROCEDURE — 8E0W4CZ ROBOTIC ASSISTED PROCEDURE OF TRUNK REGION, PERCUTANEOUS ENDOSCOPIC APPROACH: ICD-10-PCS | Performed by: SURGERY

## 2025-02-05 PROCEDURE — 50545 LAPARO RADICAL NEPHRECTOMY: CPT | Performed by: SURGERY

## 2025-02-05 PROCEDURE — 50545 LAPARO RADICAL NEPHRECTOMY: CPT

## 2025-02-05 PROCEDURE — 0TT04ZZ RESECTION OF RIGHT KIDNEY, PERCUTANEOUS ENDOSCOPIC APPROACH: ICD-10-PCS | Performed by: SURGERY

## 2025-02-05 PROCEDURE — 76942 ECHO GUIDE FOR BIOPSY: CPT | Performed by: ANESTHESIOLOGY

## 2025-02-05 PROCEDURE — 3E0T3BZ INTRODUCTION OF ANESTHETIC AGENT INTO PERIPHERAL NERVES AND PLEXI, PERCUTANEOUS APPROACH: ICD-10-PCS | Performed by: ANESTHESIOLOGY

## 2025-02-05 RX ORDER — ROCURONIUM BROMIDE 10 MG/ML
INJECTION, SOLUTION INTRAVENOUS AS NEEDED
Status: DISCONTINUED | OUTPATIENT
Start: 2025-02-05 | End: 2025-02-05 | Stop reason: SURG

## 2025-02-05 RX ORDER — HEPARIN SODIUM 5000 [USP'U]/ML
5000 INJECTION, SOLUTION INTRAVENOUS; SUBCUTANEOUS ONCE
Status: COMPLETED | OUTPATIENT
Start: 2025-02-05 | End: 2025-02-05

## 2025-02-05 RX ORDER — BUPIVACAINE HYDROCHLORIDE 2.5 MG/ML
INJECTION, SOLUTION EPIDURAL; INFILTRATION; INTRACAUDAL AS NEEDED
Status: DISCONTINUED | OUTPATIENT
Start: 2025-02-05 | End: 2025-02-05 | Stop reason: SURG

## 2025-02-05 RX ORDER — MIDAZOLAM HYDROCHLORIDE 1 MG/ML
1 INJECTION INTRAMUSCULAR; INTRAVENOUS EVERY 5 MIN PRN
Status: DISCONTINUED | OUTPATIENT
Start: 2025-02-05 | End: 2025-02-05 | Stop reason: HOSPADM

## 2025-02-05 RX ORDER — HEPARIN SODIUM 5000 [USP'U]/ML
5000 INJECTION, SOLUTION INTRAVENOUS; SUBCUTANEOUS EVERY 8 HOURS SCHEDULED
Status: DISCONTINUED | OUTPATIENT
Start: 2025-02-05 | End: 2025-02-06

## 2025-02-05 RX ORDER — DEXTROSE MONOHYDRATE, SODIUM CHLORIDE, AND POTASSIUM CHLORIDE 50; 1.49; 4.5 G/1000ML; G/1000ML; G/1000ML
INJECTION, SOLUTION INTRAVENOUS CONTINUOUS
Status: DISCONTINUED | OUTPATIENT
Start: 2025-02-05 | End: 2025-02-06

## 2025-02-05 RX ORDER — FINASTERIDE 5 MG/1
5 TABLET, FILM COATED ORAL NIGHTLY
Status: DISCONTINUED | OUTPATIENT
Start: 2025-02-05 | End: 2025-02-06

## 2025-02-05 RX ORDER — LIDOCAINE HYDROCHLORIDE ANHYDROUS AND DEXTROSE MONOHYDRATE .8; 5 G/100ML; G/100ML
INJECTION, SOLUTION INTRAVENOUS CONTINUOUS PRN
Status: DISCONTINUED | OUTPATIENT
Start: 2025-02-05 | End: 2025-02-05 | Stop reason: SURG

## 2025-02-05 RX ORDER — BUPIVACAINE HYDROCHLORIDE 2.5 MG/ML
INJECTION, SOLUTION EPIDURAL; INFILTRATION; INTRACAUDAL AS NEEDED
Status: DISCONTINUED | OUTPATIENT
Start: 2025-02-05 | End: 2025-02-05 | Stop reason: HOSPADM

## 2025-02-05 RX ORDER — METOCLOPRAMIDE HYDROCHLORIDE 5 MG/ML
10 INJECTION INTRAMUSCULAR; INTRAVENOUS EVERY 8 HOURS PRN
Status: DISCONTINUED | OUTPATIENT
Start: 2025-02-05 | End: 2025-02-05 | Stop reason: HOSPADM

## 2025-02-05 RX ORDER — ONDANSETRON 2 MG/ML
4 INJECTION INTRAMUSCULAR; INTRAVENOUS EVERY 6 HOURS PRN
Status: DISCONTINUED | OUTPATIENT
Start: 2025-02-05 | End: 2025-02-05 | Stop reason: HOSPADM

## 2025-02-05 RX ORDER — HYDROMORPHONE HYDROCHLORIDE 1 MG/ML
0.2 INJECTION, SOLUTION INTRAMUSCULAR; INTRAVENOUS; SUBCUTANEOUS EVERY 2 HOUR PRN
Status: DISCONTINUED | OUTPATIENT
Start: 2025-02-05 | End: 2025-02-06

## 2025-02-05 RX ORDER — ONDANSETRON 4 MG/1
4 TABLET, FILM COATED ORAL EVERY 6 HOURS PRN
Status: DISCONTINUED | OUTPATIENT
Start: 2025-02-05 | End: 2025-02-06

## 2025-02-05 RX ORDER — DEXAMETHASONE SODIUM PHOSPHATE 10 MG/ML
INJECTION, SOLUTION INTRAMUSCULAR; INTRAVENOUS AS NEEDED
Status: DISCONTINUED | OUTPATIENT
Start: 2025-02-05 | End: 2025-02-05 | Stop reason: SURG

## 2025-02-05 RX ORDER — SENNOSIDES 8.6 MG
17.2 TABLET ORAL NIGHTLY PRN
Status: DISCONTINUED | OUTPATIENT
Start: 2025-02-05 | End: 2025-02-06

## 2025-02-05 RX ORDER — HYDROMORPHONE HYDROCHLORIDE 1 MG/ML
0.2 INJECTION, SOLUTION INTRAMUSCULAR; INTRAVENOUS; SUBCUTANEOUS EVERY 5 MIN PRN
Status: DISCONTINUED | OUTPATIENT
Start: 2025-02-05 | End: 2025-02-05 | Stop reason: HOSPADM

## 2025-02-05 RX ORDER — ONDANSETRON 2 MG/ML
4 INJECTION INTRAMUSCULAR; INTRAVENOUS EVERY 6 HOURS PRN
Status: DISCONTINUED | OUTPATIENT
Start: 2025-02-05 | End: 2025-02-06

## 2025-02-05 RX ORDER — PHENYLEPHRINE HCL 10 MG/ML
VIAL (ML) INJECTION AS NEEDED
Status: DISCONTINUED | OUTPATIENT
Start: 2025-02-05 | End: 2025-02-05 | Stop reason: SURG

## 2025-02-05 RX ORDER — SODIUM CHLORIDE, SODIUM LACTATE, POTASSIUM CHLORIDE, CALCIUM CHLORIDE 600; 310; 30; 20 MG/100ML; MG/100ML; MG/100ML; MG/100ML
INJECTION, SOLUTION INTRAVENOUS CONTINUOUS
Status: DISCONTINUED | OUTPATIENT
Start: 2025-02-05 | End: 2025-02-05

## 2025-02-05 RX ORDER — ACETAMINOPHEN 10 MG/ML
INJECTION, SOLUTION INTRAVENOUS AS NEEDED
Status: DISCONTINUED | OUTPATIENT
Start: 2025-02-05 | End: 2025-02-05 | Stop reason: SURG

## 2025-02-05 RX ORDER — DIPHENHYDRAMINE HYDROCHLORIDE 50 MG/ML
12.5 INJECTION INTRAMUSCULAR; INTRAVENOUS AS NEEDED
Status: DISCONTINUED | OUTPATIENT
Start: 2025-02-05 | End: 2025-02-05 | Stop reason: HOSPADM

## 2025-02-05 RX ORDER — MEPERIDINE HYDROCHLORIDE 25 MG/ML
12.5 INJECTION INTRAMUSCULAR; INTRAVENOUS; SUBCUTANEOUS AS NEEDED
Status: DISCONTINUED | OUTPATIENT
Start: 2025-02-05 | End: 2025-02-05 | Stop reason: HOSPADM

## 2025-02-05 RX ORDER — BISACODYL 10 MG
10 SUPPOSITORY, RECTAL RECTAL
Status: DISCONTINUED | OUTPATIENT
Start: 2025-02-05 | End: 2025-02-06

## 2025-02-05 RX ORDER — KETAMINE HYDROCHLORIDE 50 MG/ML
INJECTION, SOLUTION INTRAMUSCULAR; INTRAVENOUS AS NEEDED
Status: DISCONTINUED | OUTPATIENT
Start: 2025-02-05 | End: 2025-02-05 | Stop reason: SURG

## 2025-02-05 RX ORDER — OXYCODONE HYDROCHLORIDE 5 MG/1
5 TABLET ORAL ONCE AS NEEDED
Status: DISCONTINUED | OUTPATIENT
Start: 2025-02-05 | End: 2025-02-05 | Stop reason: HOSPADM

## 2025-02-05 RX ORDER — HYDROMORPHONE HYDROCHLORIDE 1 MG/ML
0.6 INJECTION, SOLUTION INTRAMUSCULAR; INTRAVENOUS; SUBCUTANEOUS EVERY 5 MIN PRN
Status: DISCONTINUED | OUTPATIENT
Start: 2025-02-05 | End: 2025-02-05 | Stop reason: HOSPADM

## 2025-02-05 RX ORDER — HYDROMORPHONE HYDROCHLORIDE 1 MG/ML
0.4 INJECTION, SOLUTION INTRAMUSCULAR; INTRAVENOUS; SUBCUTANEOUS EVERY 2 HOUR PRN
Status: DISCONTINUED | OUTPATIENT
Start: 2025-02-05 | End: 2025-02-06

## 2025-02-05 RX ORDER — OXYCODONE HYDROCHLORIDE 5 MG/1
5 TABLET ORAL EVERY 4 HOURS PRN
Status: DISCONTINUED | OUTPATIENT
Start: 2025-02-05 | End: 2025-02-06

## 2025-02-05 RX ORDER — EPHEDRINE SULFATE 50 MG/ML
INJECTION INTRAVENOUS AS NEEDED
Status: DISCONTINUED | OUTPATIENT
Start: 2025-02-05 | End: 2025-02-05 | Stop reason: SURG

## 2025-02-05 RX ORDER — POLYETHYLENE GLYCOL 3350 17 G/17G
17 POWDER, FOR SOLUTION ORAL DAILY
Status: DISCONTINUED | OUTPATIENT
Start: 2025-02-05 | End: 2025-02-06

## 2025-02-05 RX ORDER — ACETAMINOPHEN 500 MG
1000 TABLET ORAL EVERY 8 HOURS SCHEDULED
Status: DISCONTINUED | OUTPATIENT
Start: 2025-02-05 | End: 2025-02-06

## 2025-02-05 RX ORDER — HYDROMORPHONE HYDROCHLORIDE 1 MG/ML
0.4 INJECTION, SOLUTION INTRAMUSCULAR; INTRAVENOUS; SUBCUTANEOUS EVERY 5 MIN PRN
Status: DISCONTINUED | OUTPATIENT
Start: 2025-02-05 | End: 2025-02-05 | Stop reason: HOSPADM

## 2025-02-05 RX ORDER — MIDAZOLAM HYDROCHLORIDE 1 MG/ML
INJECTION INTRAMUSCULAR; INTRAVENOUS AS NEEDED
Status: DISCONTINUED | OUTPATIENT
Start: 2025-02-05 | End: 2025-02-05 | Stop reason: SURG

## 2025-02-05 RX ORDER — ACETAMINOPHEN 500 MG
1000 TABLET ORAL ONCE
Status: DISCONTINUED | OUTPATIENT
Start: 2025-02-05 | End: 2025-02-05 | Stop reason: HOSPADM

## 2025-02-05 RX ORDER — OXYCODONE HYDROCHLORIDE 5 MG/1
10 TABLET ORAL ONCE AS NEEDED
Status: DISCONTINUED | OUTPATIENT
Start: 2025-02-05 | End: 2025-02-05 | Stop reason: HOSPADM

## 2025-02-05 RX ORDER — SODIUM CHLORIDE 9 MG/ML
INJECTION, SOLUTION INTRAVENOUS CONTINUOUS PRN
Status: DISCONTINUED | OUTPATIENT
Start: 2025-02-05 | End: 2025-02-05 | Stop reason: SURG

## 2025-02-05 RX ORDER — NALOXONE HYDROCHLORIDE 0.4 MG/ML
0.08 INJECTION, SOLUTION INTRAMUSCULAR; INTRAVENOUS; SUBCUTANEOUS AS NEEDED
Status: DISCONTINUED | OUTPATIENT
Start: 2025-02-05 | End: 2025-02-05 | Stop reason: HOSPADM

## 2025-02-05 RX ORDER — FAMOTIDINE 20 MG/1
20 TABLET, FILM COATED ORAL 2 TIMES DAILY PRN
Status: DISCONTINUED | OUTPATIENT
Start: 2025-02-05 | End: 2025-02-06

## 2025-02-05 RX ORDER — LIDOCAINE HYDROCHLORIDE 10 MG/ML
INJECTION, SOLUTION EPIDURAL; INFILTRATION; INTRACAUDAL; PERINEURAL AS NEEDED
Status: DISCONTINUED | OUTPATIENT
Start: 2025-02-05 | End: 2025-02-05 | Stop reason: SURG

## 2025-02-05 RX ORDER — HYDROCODONE BITARTRATE AND ACETAMINOPHEN 5; 325 MG/1; MG/1
1 TABLET ORAL EVERY 6 HOURS PRN
Qty: 10 TABLET | Refills: 0 | Status: SHIPPED | OUTPATIENT
Start: 2025-02-05

## 2025-02-05 RX ORDER — HEPARIN SODIUM 5000 [USP'U]/ML
INJECTION, SOLUTION INTRAVENOUS; SUBCUTANEOUS
Status: COMPLETED
Start: 2025-02-05 | End: 2025-02-05

## 2025-02-05 RX ORDER — GLYCOPYRROLATE 0.2 MG/ML
INJECTION, SOLUTION INTRAMUSCULAR; INTRAVENOUS AS NEEDED
Status: DISCONTINUED | OUTPATIENT
Start: 2025-02-05 | End: 2025-02-05 | Stop reason: SURG

## 2025-02-05 RX ORDER — DEXAMETHASONE SODIUM PHOSPHATE 4 MG/ML
VIAL (ML) INJECTION AS NEEDED
Status: DISCONTINUED | OUTPATIENT
Start: 2025-02-05 | End: 2025-02-05 | Stop reason: SURG

## 2025-02-05 RX ORDER — SODIUM CHLORIDE, SODIUM LACTATE, POTASSIUM CHLORIDE, CALCIUM CHLORIDE 600; 310; 30; 20 MG/100ML; MG/100ML; MG/100ML; MG/100ML
INJECTION, SOLUTION INTRAVENOUS CONTINUOUS
Status: DISCONTINUED | OUTPATIENT
Start: 2025-02-05 | End: 2025-02-05 | Stop reason: HOSPADM

## 2025-02-05 RX ORDER — NEOSTIGMINE METHYLSULFATE 1 MG/ML
INJECTION INTRAVENOUS AS NEEDED
Status: DISCONTINUED | OUTPATIENT
Start: 2025-02-05 | End: 2025-02-05 | Stop reason: SURG

## 2025-02-05 RX ORDER — ONDANSETRON 2 MG/ML
INJECTION INTRAMUSCULAR; INTRAVENOUS AS NEEDED
Status: DISCONTINUED | OUTPATIENT
Start: 2025-02-05 | End: 2025-02-05 | Stop reason: SURG

## 2025-02-05 RX ORDER — POLYETHYLENE GLYCOL 3350 17 G/17G
17 POWDER, FOR SOLUTION ORAL DAILY PRN
Qty: 20 PACKET | Refills: 1 | Status: SHIPPED | OUTPATIENT
Start: 2025-02-05

## 2025-02-05 RX ORDER — TAMSULOSIN HYDROCHLORIDE 0.4 MG/1
0.8 CAPSULE ORAL NIGHTLY
Status: DISCONTINUED | OUTPATIENT
Start: 2025-02-05 | End: 2025-02-06

## 2025-02-05 RX ORDER — OXYCODONE HYDROCHLORIDE 10 MG/1
10 TABLET ORAL EVERY 4 HOURS PRN
Status: DISCONTINUED | OUTPATIENT
Start: 2025-02-05 | End: 2025-02-06

## 2025-02-05 RX ADMIN — SODIUM CHLORIDE, SODIUM LACTATE, POTASSIUM CHLORIDE, CALCIUM CHLORIDE: 600; 310; 30; 20 INJECTION, SOLUTION INTRAVENOUS at 11:30:00

## 2025-02-05 RX ADMIN — SODIUM CHLORIDE: 9 INJECTION, SOLUTION INTRAVENOUS at 07:50:00

## 2025-02-05 RX ADMIN — PHENYLEPHRINE HCL 100 MCG: 10 MG/ML VIAL (ML) INJECTION at 10:56:00

## 2025-02-05 RX ADMIN — SODIUM CHLORIDE, SODIUM LACTATE, POTASSIUM CHLORIDE, CALCIUM CHLORIDE: 600; 310; 30; 20 INJECTION, SOLUTION INTRAVENOUS at 09:05:00

## 2025-02-05 RX ADMIN — MIDAZOLAM HYDROCHLORIDE 2 MG: 1 INJECTION INTRAMUSCULAR; INTRAVENOUS at 07:39:00

## 2025-02-05 RX ADMIN — DEXAMETHASONE SODIUM PHOSPHATE 8 MG: 4 MG/ML VIAL (ML) INJECTION at 08:11:00

## 2025-02-05 RX ADMIN — GLYCOPYRROLATE 0.6 MG: 0.2 INJECTION, SOLUTION INTRAMUSCULAR; INTRAVENOUS at 11:20:00

## 2025-02-05 RX ADMIN — ROCURONIUM BROMIDE 20 MG: 10 INJECTION, SOLUTION INTRAVENOUS at 09:01:00

## 2025-02-05 RX ADMIN — LIDOCAINE HYDROCHLORIDE 50 MG: 10 INJECTION, SOLUTION EPIDURAL; INFILTRATION; INTRACAUDAL; PERINEURAL at 07:44:00

## 2025-02-05 RX ADMIN — ACETAMINOPHEN 1000 MG: 10 INJECTION, SOLUTION INTRAVENOUS at 10:49:00

## 2025-02-05 RX ADMIN — DEXAMETHASONE SODIUM PHOSPHATE 2 MG: 10 INJECTION, SOLUTION INTRAMUSCULAR; INTRAVENOUS at 11:23:00

## 2025-02-05 RX ADMIN — ROCURONIUM BROMIDE 30 MG: 10 INJECTION, SOLUTION INTRAVENOUS at 10:01:00

## 2025-02-05 RX ADMIN — BUPIVACAINE HYDROCHLORIDE 30 ML: 2.5 INJECTION, SOLUTION EPIDURAL; INFILTRATION; INTRACAUDAL at 11:23:00

## 2025-02-05 RX ADMIN — ROCURONIUM BROMIDE 100 MG: 10 INJECTION, SOLUTION INTRAVENOUS at 07:44:00

## 2025-02-05 RX ADMIN — SODIUM CHLORIDE, SODIUM LACTATE, POTASSIUM CHLORIDE, CALCIUM CHLORIDE: 600; 310; 30; 20 INJECTION, SOLUTION INTRAVENOUS at 07:40:00

## 2025-02-05 RX ADMIN — EPHEDRINE SULFATE 15 MG: 50 INJECTION INTRAVENOUS at 08:18:00

## 2025-02-05 RX ADMIN — LIDOCAINE HYDROCHLORIDE ANHYDROUS AND DEXTROSE MONOHYDRATE 1.5 MG/KG/HR: .8; 5 INJECTION, SOLUTION INTRAVENOUS at 07:51:00

## 2025-02-05 RX ADMIN — EPHEDRINE SULFATE 10 MG: 50 INJECTION INTRAVENOUS at 09:01:00

## 2025-02-05 RX ADMIN — KETAMINE HYDROCHLORIDE 20 MG: 50 INJECTION, SOLUTION INTRAMUSCULAR; INTRAVENOUS at 07:51:00

## 2025-02-05 RX ADMIN — NEOSTIGMINE METHYLSULFATE 4 MG: 1 INJECTION INTRAVENOUS at 11:20:00

## 2025-02-05 RX ADMIN — EPHEDRINE SULFATE 5 MG: 50 INJECTION INTRAVENOUS at 07:54:00

## 2025-02-05 RX ADMIN — ONDANSETRON 4 MG: 2 INJECTION INTRAMUSCULAR; INTRAVENOUS at 10:49:00

## 2025-02-05 NOTE — ANESTHESIA PREPROCEDURE EVALUATION
PRE-OP EVALUATION    Patient Name: William Dhillon    Admit Diagnosis: Renal mass [N28.89]    Pre-op Diagnosis: Renal mass [N28.89]    ROBOT-ASSISTED LAPAROSCOPIC RIGHT RADICAL NEPHRECTOMY    Anesthesia Procedure: ROBOT-ASSISTED LAPAROSCOPIC RIGHT RADICAL NEPHRECTOMY (Right)    Surgeons and Role:     * Kavon Smith MD - Primary    Pre-op vitals reviewed.        Body mass index is 26.45 kg/m².    Current medications reviewed.  Hospital Medications:  No current facility-administered medications on file as of .       Outpatient Medications:   Prescriptions Prior to Admission[1]    Allergies: Patient has no known allergies.      Anesthesia Evaluation        Anesthetic Complications           GI/Hepatic/Renal             (+) chronic renal disease                    Cardiovascular    Negative cardiovascular ROS.    Exercise tolerance: good     MET: >4                                           Endo/Other    Negative endo/other ROS.                              Pulmonary    Negative pulmonary ROS.                       Neuro/Psych    Negative neuro/psych ROS.                          Right renal mass.        Past Surgical History:   Procedure Laterality Date    Cataract  2023    Colonoscopy  2023    Results were good    Other surgical history      5/2024 WRIST MOH'S PROCEDURE     Social History     Socioeconomic History    Marital status:    Tobacco Use    Smoking status: Never    Smokeless tobacco: Never   Vaping Use    Vaping status: Never Used   Substance and Sexual Activity    Alcohol use: Yes     Alcohol/week: 6.0 standard drinks of alcohol     Types: 6 Cans of beer per week     Comment: 2X WEEKLY    Drug use: Never   Other Topics Concern    Grew up on a farm No    History of tanning No    Outdoor occupation No    Reaction to local anesthetic No    Pt has a pacemaker No    Pt has a defibrillator No     History   Drug Use Unknown     Available pre-op labs reviewed.  Lab Results   Component Value Date    WBC  12.6 (H) 01/20/2025    RBC 4.15 01/20/2025    HGB 13.7 01/20/2025    HCT 41.6 01/20/2025    .2 (H) 01/20/2025    MCH 33.0 01/20/2025    MCHC 32.9 01/20/2025    RDW 12.7 01/20/2025    .0 01/20/2025     Lab Results   Component Value Date     01/20/2025    K 4.5 01/20/2025     01/20/2025    CO2 25.0 01/20/2025    BUN 22 01/20/2025    CREATSERUM 0.96 01/20/2025    GLU 96 01/20/2025    CA 10.3 01/20/2025     Lab Results   Component Value Date    INR 1.07 01/20/2025         Airway      Mallampati: III  Mouth opening: >3 FB  TM distance: > 6 cm  Neck ROM: full Cardiovascular    Cardiovascular exam normal.         Dental    Dentition appears grossly intact         Pulmonary    Pulmonary exam normal.                 Other findings              ASA: 2   Plan: general  NPO status verified and patient meets guidelines.    Post-procedure pain management plan discussed with surgeon and patient.  Surgeon requests: regional block  Comment: Invasive monitoring, additional PIV access, and TAP blockade d/w patient.  Plan/risks discussed with: patient  Use of blood product(s) discussed with: patient              Present on Admission:  **None**             [1]   No medications prior to admission.

## 2025-02-05 NOTE — ANESTHESIA PROCEDURE NOTES
Airway  Date/Time: 2/5/2025 7:46 AM  Urgency: elective      General Information and Staff    Patient location during procedure: OR  Anesthesiologist: Hollis Sullivan MD  Resident/CRNA: Too Dixon CRNA  Performed: CRNA   Performed by: Too Dixon CRNA  Authorized by: Hollis Sullivan MD      Indications and Patient Condition  Indications for airway management: anesthesia  Sedation level: deep  Preoxygenated: yes  Patient position: sniffing  Mask difficulty assessment: 1 - vent by mask    Final Airway Details  Final airway type: endotracheal airway      Successful airway: ETT  Cuffed: yes   Successful intubation technique: Video laryngoscopy  Endotracheal tube insertion site: oral  Blade: GlideScope  Blade size: #3  ETT size (mm): 8.0    Cormack-Lehane Classification: grade I - full view of glottis  Placement verified by: capnometry   Measured from: lips  ETT to lips (cm): 23  Number of attempts at approach: 1

## 2025-02-05 NOTE — ANESTHESIA POSTPROCEDURE EVALUATION
[facility]    William Dhillon Patient Status:  Inpatient   Age/Gender 70 year old male MRN [id number]   Location [facility] POST ANESTHESIA CARE UNIT Attending Kavon Smith MD   Hosp Day # 0 PCP No primary care provider on file.       Anesthesia Post-op Note    ROBOT-ASSISTED LAPAROSCOPIC RIGHT RADICAL NEPHRECTOMY    Procedure Summary       Date: 02/05/25 Room / Location:  MAIN OR [address] [place] [address] MAIN OR    Anesthesia Start: 0732 Anesthesia Stop: 1140    Procedure: ROBOT-ASSISTED LAPAROSCOPIC RIGHT RADICAL NEPHRECTOMY (Right: Flank) Diagnosis:       Renal mass      (Renal mass [N28.89])    Surgeons: Kavon Smith MD Anesthesiologist: Hollis Sullivan MD    Anesthesia Type: general ASA Status: 2            Anesthesia Type: general    Vitals Value Taken Time   /74 02/05/25 1142   Temp 97.9 02/05/25 1144   Pulse 77 02/05/25 1143   Resp 21 02/05/25 1143   SpO2 98 % 02/05/25 1143   Vitals shown include unfiled device data.        Patient Location: PACU    Anesthesia Type: general    Airway Patency: patent    Postop Pain Control: adequate    Mental Status: mildly sedated but able to meaningfully participate in the post-anesthesia evaluation    Nausea/Vomiting: none    Cardiopulmonary/Hydration status: stable euvolemic    Complications: no apparent anesthesia related complications    Postop vital signs: stable    Dental Exam: Unchanged from Preop    Patient to be discharged from PACU when criteria met.

## 2025-02-05 NOTE — SPIRITUAL CARE NOTE
Spiritual Care Visit Note    Patient Name: William Dhillon Date of Spiritual Care Visit: 25   : 1/3/1955 Primary Dx: <principal problem not specified>       Referred By: Referral From: Other (Comment)    Spiritual Care Taxonomy:    Intended Effects: Promote a sense of peace    Methods: Offer support    Interventions: Acknowledge current situation;Active listening;Ask guided questions;Explain  role    Visit Type/Summary:     - Spiritual Care: Patient and family expressed appreciation for  visit.    Spiritual Care support can be requested via an Epic consult. For urgent/immediate needs, please contact the On Call  at: Edward: ext [phone]

## 2025-02-05 NOTE — OPERATIVE REPORT
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 The patient was counseled on options, risks, and benefits and elected to undergo the above procedure. We discussed risks including, but not limited to, bleeding, infection, damage to surrounding structures, need for repeat procedure(s). The patient understood these risks and wished to proceed with surgery.    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 Uncomplicated adrenal sparing nephrectomy.    Procedure:  The patient was taken to the operating room and a timeout was performed confirming the correct patient and procedure.  The patient underwent general anesthesia with endotracheal tube.  An orogastric tube was placed to suction.  Pre-operative prophylactic antibiotics were given in the form of Ancef. Subcutaneous heparin was given in the pre-operative area. An orogastric tube was placed.    The patient was prepped and draped in partially flexed, relaxed flank position with the right side up. We ensured the patient was well padded, secured, had an axillary roll in place, and had no joint hyperextension.     The Veress needle was inserted into the abdomen and confirmed to be in the correct position with aspiration/irrigation of saline and low opening pressure.  The abdomen was insufflated to 15 mmHg.  The ports were marked out along the lateral rectus border approximately.  The first port was placed using an 8 mm robotic port and then the 30 degree robotic camera was inserted.  There was no injury to surrounding structures within the abdomen.  The Veress needle was  visualized and did not injure anything, so was then removed.  The remaining ports were placed under direct visualization.  Four robotic ports were placed in an approximately vertical line along the lateral rectus border. The second inferior-most port was a 12 mm robotic port in order to accommodate the robotic stapler. A 12 mm air seal assistant port was placed near the midline between the superior-most 2 robotic ports.A 5 mm liver retractor port was placed towards the midline and above the superior-most robotic port.  The robot was then docked.  Instruments were attached including monopolar scissors in the right hand, fenestrated bipolar in the left hand, and tip up grasper in the lowermost assistant fourth arm.  The liver retractor was placed lifting the liver up and grasping the sidewall with a locking laparoscopic grasper.    The colon was reflected medially by incising the white line of Toldt.  Eventually the colon was medial enough so that the entire anterior surface of the kidney was exposed.    I then saw the edge of the duodenum and reflected this medially as well using sharp dissection, taking care to use no cautery near the duodenum.  The anterior surface of the inferior vena cava was then seen.  This was freed off carefully.  The insertion of the gonadal vein was seen as well as the takeoff of the right renal vein.    At this point, a window was made lateral to the gonadal vein, but medial to the ureter and down to the psoas muscle posteriorly. A lower pole artery and vein were noted at the level of UPJ obstruction. I freed these and ligated with a 45 white load stapler.   Once this window was created the fourth arm was inserted to lift the kidney anteriorly and dissection behind the kidney was performed bluntly.  Using this retraction I was able to free most of the attachments below the renal hilum.  The lower edge and anterior surface of the renal vein were then dissected free.  Behind the renal vein  the renal artery was found, which I dissected free as well.  I then freed the superior surface of the renal vein and made a window on both sides of the renal hilum.  At this point the hilum was free.    The 45 cm white vascular load robotic stapler was used to take the renal hilum (2 stapler loads used).  The remaining attachments around the hilum were taken and hemostasis was excellent.    I then used a combination of blunt dissection and electrocautery to incise the lateral attachments and superior attachments of the kidney.  The adrenal gland was visualized and  from the perinephric fat.  The ureter was skeletonized and cut above a Weck clip.    At this point the kidney was free. I cut into the renal pelvis and suctioned out as much urine as possible to make the specimen smaller. It was placed in an EndoCatch bag.  I decreased the insufflation pressure and surveyed for hemostasis, which was excellent.    Surgicel was used in the surgical bed of the hilar dissection and near the adrenal gland.    The robot was then undocked.  The second inferior-most incision was extended to accommodate removal of the kidney.  The kidney was removed and passed off the field for specimen.  The fascia of the incision was closed with running 0-PDS.  I then reinsufflated the abdomen and looked at the back of our closure which was excellent with no air leaks and no bowel or omentum tacked to the abdominal wall.  I looked again in the surgical area and hemostasis remained excellent.  I removed the Airseal port and closed the fascia using a fascia closure device and 0-Vicryl suture.  I then desufflated the abdomen and removed all the ports.    Local anesthesia was given in the form of 30 mL 0.25% Marcaine to each incision prior to skin closure.    The skin incisions were closed with subcuticular 4-0 Monocryl suture.  Skin glue was applied over the incisions after patient was cleaned and dried.    A TAP block was performed by the  Anesthesia team.    The patient was awoken from anesthesia and transferred to PACU in stable condition. The patient tolerated the procedure well. All instrument/supply counts were correct at the end of the case.    Specimens:   Right kidney.    Estimated Blood Loss:  25 mL    Tubes/Drains:  16 [place] Ponce catheter    Complications:   None immediate    Condition from OR:  Stable    Plan:   Admit to the hospital for likely 1 night for pain control, labs, advance diet as tolerated.      Kavon Smith MD  Staff Urologist  [facility]  Office: [phone]

## 2025-02-05 NOTE — ANESTHESIA PROCEDURE NOTES
Regional Block    Date/Time: 2/5/2025 11:20 AM    Performed by: Too Dixon CRNA  Authorized by: Hollis Sullivan MD      General Information and Staff    Start Time:  2/5/2025 11:20 AM  End Time:  2/5/2025 11:26 AM  Anesthesiologist:  Hollis Sullivan MD  CRNA:  Too Dixon CRNA  Performed by:  CRNA  Patient Location:  OR    Block Placement: Post Induction  Site Identification: real time ultrasound guided and image stored and retrievable    Block site/laterality marked before start: site marked  Reason for Block: at surgeon's request and post-op pain management    Preanesthetic Checklist: 2 patient identifers, IV checked, site marked, risks and benefits discussed, monitors and equipment checked, pre-op evaluation, timeout performed, anesthesia consent, sterile technique used, no prohibitive neurological deficits and no local skin infection at insertion site      Procedure Details    Patient Position:  Supine  Prep: ChloraPrep    Monitoring:  Cardiac monitor, continuous pulse ox, blood pressure cuff and heart rate  Block Type:  TAP  Laterality:  Right  Injection Technique:  Single-shot    Needle    Needle Type:  Short-bevel and echogenic  Needle Gauge:  21 G  Needle Length:  110 mm  Needle Localization:  Ultrasound guidance  Reason for Ultrasound Use: appropriate spread of the medication was noted in real time and no ultrasound evidence of intravascular and/or intraneural injection            Assessment    Injection Assessment:  Good spread noted, negative resistance, negative aspiration for heme, incremental injection and low pressure  Heart Rate Change: No    - Patient tolerated block procedure well without evidence of immediate block related complications.     Medications  2/5/2025 11:20 AM      Additional Comments    Medication:  Bupivacaine 0.25% 30mL + 2 mg PF decadron

## 2025-02-05 NOTE — ANESTHESIA PROCEDURE NOTES
Peripheral IV  Date/Time: 2/5/2025 7:50 AM  Inserted by: Too Dixon CRNA    Placement  Needle size: 18 G  Laterality: right  Location: hand  Local anesthetic: none  Site prep: alcohol  Technique: anatomical landmarks  Attempts: 1

## 2025-02-05 NOTE — DISCHARGE INSTRUCTIONS
You had a laparoscopic/robotic kidney surgery in the operating room.    Instructions:    Follow-Up:    Your follow-up appointment is listed below. Please contact us at [phone] if you need to change your appointment.    Your appointments       Date & Time Appointment Department ([place])    Feb 20, 2025 10:15 AM CST Post Op Visit with Kavon Smith MD [facility] ([place] [address])              [place] [address]  [address]  [facility] [id number] [phone]           Activity:    No heavy lifting > 15 lbs or strenuous activity for 6 weeks, to avoid risk for hernia. This includes activities such as golf, tennis, mowing the lawn, lifting weights, and running.    You should walk around often after surgery (at least 6 times per day). Even if you are sore, you should avoid laying down all day as this can put you at risk for complications including blood clots, pneumonia, and constipation.     You may shower starting 2 days after surgery. Let the soapy water run over the incisions and do not scrub them. You have dissolvable stitches under the skin and surgical skin glue on the incisions. The glue will flake off within 1-2 weeks. You should not soak, bathe, swim, or use a hot tub for 2 weeks.    Do not drive until you are off narcotic pain medications and your pain is gone. This typically takes 1-2 weeks. Avoid long car rides over 3-4 hours for the next month, as this can increase your blood clot risk.    Diet:    Eat light meals for the first few days after surgery, and focus on good fluid intake instead to avoid dehydration.     Try to drink at least 6-8 glasses of water per day. Avoid soda or carbonated beverages for the first few days after surgery.    Medications:    You had one of your kidneys removed, so you need to protect your remaining kidney. You should avoid taking any medications that can  harm your kidney, particularly NSAIDs (i.e. Ibuprofen, Motrin, Advil, Toradol, Aleve, Naproxen). You should try to always stay hydrated. I will set you up to see a nephrologist as an outpatient so they can discuss protection monitoring of your new solitary kidney as well.    You may experience pain after the procedure for a few days.  If pain becomes intolerable please contact our office or go to the nearest Emergency Room/Immediate Care. You make take over-the counter Tylenol/Acetaminophen for mild pain.  For pain not controlled by Tylenol, you may take the prescribed narcotic pain medication oxycodone. Try to stop any narcotic pain medications as soon as possible after surgery when your pain improves, as they can be addictive and cause constipation.    Do not take more than 4000 mg of Tylenol per day. If you have any liver problems, talk with your primary care doctor about your maximum Tylenol limit, as it may be less than 4000 mg/day.    Take the prescribed MiraLAX to soften your stool.  Once you are off narcotic pain medication and having daily, soft bowel movements you can stop this medication.    If you take blood thinners (such as aspirin, plavix, coumadin, xarelto, etc) please DO NOT take them when you go home. You may resume these medications in 7 days.    Other:    Call the office at [phone] (after-hours you will be directed to the urologist on call) if you develop a fever > 101°F, chills, redness and drainage from your incisions, chest pain, difficulty breathing, difficulty urinating, significant abdominal pain, vomiting, or leg swelling or pain in the next few weeks.        Kavon Smith MD  Staff Urologist  [facility]  Office: [phone]

## 2025-02-05 NOTE — PLAN OF CARE
NURSING ADMISSION NOTE      Patient admitted via Cart  Oriented to room.  Safety precautions initiated.  Bed in low position.  Call light in reach.    1240: Pt arrived to floor in stable condition. Skin check performed with El RN, no breakdown noted. Incisions x6.

## 2025-02-06 ENCOUNTER — TELEPHONE (OUTPATIENT)
Dept: SURGERY | Facility: CLINIC | Age: 70
End: 2025-02-06

## 2025-02-06 VITALS
HEIGHT: 72 IN | RESPIRATION RATE: 18 BRPM | DIASTOLIC BLOOD PRESSURE: 79 MMHG | TEMPERATURE: 99 F | WEIGHT: 197.81 LBS | HEART RATE: 62 BPM | SYSTOLIC BLOOD PRESSURE: 141 MMHG | OXYGEN SATURATION: 97 % | BODY MASS INDEX: 26.79 KG/M2

## 2025-02-06 LAB
ANION GAP SERPL CALC-SCNC: 11 MMOL/L (ref 0–18)
BUN BLD-MCNC: 10 MG/DL (ref 9–23)
CALCIUM BLD-MCNC: 8.9 MG/DL (ref 8.7–10.6)
CHLORIDE SERPL-SCNC: 104 MMOL/L (ref 98–112)
CO2 SERPL-SCNC: 24 MMOL/L (ref 21–32)
CREAT BLD-MCNC: 0.99 MG/DL
EGFRCR SERPLBLD CKD-EPI 2021: 83 ML/MIN/1.73M2 (ref 60–?)
ERYTHROCYTE [DISTWIDTH] IN BLOOD BY AUTOMATED COUNT: 12.6 %
GLUCOSE BLD-MCNC: 117 MG/DL (ref 70–99)
HCT VFR BLD AUTO: 32.7 %
HGB BLD-MCNC: 11 G/DL
MCH RBC QN AUTO: 32.7 PG (ref 26–34)
MCHC RBC AUTO-ENTMCNC: 33.6 G/DL (ref 31–37)
MCV RBC AUTO: 97.3 FL
OSMOLALITY SERPL CALC.SUM OF ELEC: 288 MOSM/KG (ref 275–295)
PLATELET # BLD AUTO: 164 10(3)UL (ref 150–450)
POTASSIUM SERPL-SCNC: 3.7 MMOL/L (ref 3.5–5.1)
RBC # BLD AUTO: 3.36 X10(6)UL
SODIUM SERPL-SCNC: 139 MMOL/L (ref 136–145)
WBC # BLD AUTO: 10.2 X10(3) UL (ref 4–11)

## 2025-02-06 NOTE — PROGRESS NOTES
A/Ox4, RA, IVF, IV abx, FLD tolerated well.  Voiding adequately via folely catheter.  Pain managed with prn and scheduled medication, no compaints of nausea.  Lap sites are c/d/I with skin glue.  Pt and wife updated on POC, call light within reach, questions and concerns addressed.

## 2025-02-06 NOTE — PROGRESS NOTES
[facility]   part of [facility]    Progress Note    William Dhillon Patient Status:  Inpatient    1/3/1955 MRN [id number]   Location [facility] 3NW-A Attending Kavon Smith MD   Hosp Day # 1 PCP No primary care provider on file.     Subjective:   William Dhillon is a(n) 70 year old male s/p Robot-assisted laparoscopic RIGHT radical nephrectomy 25.    No overnight events.  No fevers  Labs reviewed    Objective:   Blood pressure 128/71, pulse 64, temperature 98.4 °F (36.9 °C), temperature source Oral, resp. rate 18, height 6' (1.829 m), weight 197 lb 12.8 oz (89.7 kg), SpO2 96%.    No distress  Non labored respirations  Incisions c/d/I, softly distended  Rangel draining clear yellow    Results:   Lab Results   Component Value Date    WBC 10.2 2025    HGB 11.0 (L) 2025    HCT 32.7 (L) 2025    .0 2025    CREATSERUM 0.99 2025    BUN 10 2025     2025    K 3.7 2025     2025    CO2 24.0 2025     (H) 2025    CA 8.9 2025    INR 1.07 2025    PSA 4.2 (H) 2024       No results found.        Assessment & Plan:   Atrophic kidney s/p Robot-assisted laparoscopic RIGHT radical nephrectomy 25  AF, HD stable  Leukocytosis resolved  Hgb stable    PLAN - Encouraged ambulation and use of IS x 10/hr. OK to d'c rangel catheter. Restrictions reviewed. D'c home later today.  Future Appointments   Date Time Provider Department Center   2025 10:15 AM Kavon Smith MD [id number] Nap 4         Saira Bean PA-C  [facility]  2025

## 2025-02-06 NOTE — PROGRESS NOTES
Alert & oriented x4. VSS on room air. Rangel removed per orders, DTV. Tolerating diet. Ambulates with standby assist. Laps x6 with skin glue COCO and c/d/I. Denies nausea/chest pain/SOB. Pain controlled. Patient updated on plan of care. Questions and concerns addressed. Safety precautions in place. Frequent rounds performed.    1350: Pt voided 350 and PVR was 588.  1400: Pt voided 150 and PVR was 486. JULIO CESAR Chávez made aware, said to get 1 more PVR. Updated pt about last chance to void, asked pt to call once ready. Educated pt that if he is unable to void a significant amount, we may need to put rangel catheter back in.   1607: Pt voided 400 and PVR was 379. Saira HARRELL made aware that pt will most likely refuse rangel catheter reinsertion. Saira and Dr. Smith ok with pt going without rangel and following up tomorrow in office to do PVR.

## 2025-02-06 NOTE — PROGRESS NOTES
The patient is alert and oriented x 4, and his vital signs are stable. Dilaudid and Extra Strength Tylenol administered for the patient's post operative pain. He is tolerating full liquids with no complaint of nausea.

## 2025-02-06 NOTE — DISCHARGE SUMMARY
[facility]   part of [facility]    Discharge Summary    William Dhillon Patient Status:  Inpatient    1/3/1955 MRN [id number]   Location [facility] 3NW-A Attending Kavon Smith MD   Hosp Day # 1 PCP No primary care provider on file.     Date of Admission: 2025 Disposition: Final discharge disposition not confirmed     Date of Discharge: 2025    Admitting Diagnosis: Renal mass [N28.89]  Atrophic kidney  Pre-op testing    Discharge Diagnosis: Renal atrophy  Patient Active Problem List   Diagnosis    Atrophic kidney    Pre-op testing       Reason for Admission: renal atrophy    Physical Exam: see progress note dated 25    History of Present Illness:  70 year old male who presented with an atrophic right kidney related to likely UPJ obstruction. The patient was counseled on options, risks, and benefits and elected to undergo the above procedure. We discussed risks including, but not limited to, bleeding, infection, damage to surrounding structures, need for repeat procedure(s). The patient understood these risks and wished to proceed with surgery.     Hospital Course: After informed consent was obtained for robotic assisted laparoscopic right radical nephrectomy, the patient was brought to the OR where aforementioned procedure was completed without any intraoperative complication. Patient was transferred from PACU to the floor without event. Patient progressed as expected on the floor, tolerating advancement of diet with appropriate return of bowel function. Pain is well controlled on oral pain medications and patient is ambulatory. Ponce was removed and patient voiding with stable post void residuals.  Restrictions and post op instructions were reviewed.       Consultations: none    Procedures: robotic assisted laparoscopic right radical nephrectomy    Complications: none    Discharge Condition: Stable     **Certification    Admission date was 2025.  Inpatient stay was shorter  than expected.  Patient's <principal problem not specified> was initially serious enough to expect a more lengthy hospitalization but patient improved faster than expected.                 Discharge Medications:      Discharge Medications        START taking these medications        Instructions Prescription details   HYDROcodone-acetaminophen 5-325 MG Tabs  Commonly known as: Norco      Take 1 tablet by mouth every 6 (six) hours as needed for Pain (For pain not controlled by tylenol or ibuprofen.). This contains tylenol - do not exceed 4 g of tylenol daily.   Quantity: 10 tablet  Refills: 0     Polyethylene Glycol 3350 17 g Pack  Commonly known as: MiraLax      Take 17 g by mouth daily as needed (Take to avoid constipation, especially if taking narcotic pain medications.).   Quantity: 20 packet  Refills: 1            CHANGE how you take these medications        Instructions Prescription details   finasteride 5 MG Tabs  Commonly known as: Proscar  What changed: when to take this      Take 1 tablet (5 mg total) by mouth daily.   Quantity: 90 tablet  Refills: 6            CONTINUE taking these medications        Instructions Prescription details   GLUCOSAMINE CHONDROIT(BIOFLAV) OR      Take 1 tablet by mouth before breakfast.   Refills: 0     multivitamin Chew      Chew 1 tablet by mouth daily.   Refills: 0     tamsulosin 0.4 MG Caps  Commonly known as: Flomax      Take 2 capsules (0.8 mg total) by mouth every evening.   Quantity: 180 capsule  Refills: 6               Where to Get Your Medications        These medications were sent to [address], [place] [address] [phone], [phone]  [address] [place] [address], [facility] [address]      Phone: [phone]   HYDROcodone-acetaminophen 5-325 MG Tabs  Polyethylene Glycol 3350 17 g Pack         Other Discharge Instructions:   You had a laparoscopic/robotic kidney surgery in the operating  room.    Instructions:    Follow-Up:    Your follow-up appointment is listed below. Please contact us at [phone] if you need to change your appointment.    Your appointments       Date & Time Appointment Department ([place])    Feb 20, 2025 10:15 AM CST Post Op Visit with Kavon Smith MD [facility] ([facility])              [place] [address]  [address]  [facility] [id number] [phone]           Activity:    No heavy lifting > 15 lbs or strenuous activity for 6 weeks, to avoid risk for hernia. This includes activities such as golf, tennis, mowing the lawn, lifting weights, and running.    You should walk around often after surgery (at least 6 times per day). Even if you are sore, you should avoid laying down all day as this can put you at risk for complications including blood clots, pneumonia, and constipation.     You may shower starting 2 days after surgery. Let the soapy water run over the incisions and do not scrub them. You have dissolvable stitches under the skin and surgical skin glue on the incisions. The glue will flake off within 1-2 weeks. You should not soak, bathe, swim, or use a hot tub for 2 weeks.    Do not drive until you are off narcotic pain medications and your pain is gone. This typically takes 1-2 weeks. Avoid long car rides over 3-4 hours for the next month, as this can increase your blood clot risk.    Diet:    Eat light meals for the first few days after surgery, and focus on good fluid intake instead to avoid dehydration.     Try to drink at least 6-8 glasses of water per day. Avoid soda or carbonated beverages for the first few days after surgery.    Medications:    You had one of your kidneys removed, so you need to protect your remaining kidney. You should avoid taking any medications that can harm your kidney, particularly NSAIDs (i.e. Ibuprofen, Motrin,  Advil, Toradol, Aleve, Naproxen). You should try to always stay hydrated. I will set you up to see a nephrologist as an outpatient so they can discuss protection monitoring of your new solitary kidney as well.    You may experience pain after the procedure for a few days.  If pain becomes intolerable please contact our office or go to the nearest Emergency Room/Immediate Care. You make take over-the counter Tylenol/Acetaminophen for mild pain.  For pain not controlled by Tylenol, you may take the prescribed narcotic pain medication oxycodone. Try to stop any narcotic pain medications as soon as possible after surgery when your pain improves, as they can be addictive and cause constipation.    Do not take more than 4000 mg of Tylenol per day. If you have any liver problems, talk with your primary care doctor about your maximum Tylenol limit, as it may be less than 4000 mg/day.    Take the prescribed MiraLAX to soften your stool.  Once you are off narcotic pain medication and having daily, soft bowel movements you can stop this medication.    If you take blood thinners (such as aspirin, plavix, coumadin, xarelto, etc) please DO NOT take them when you go home. You may resume these medications in 7 days.    Other:    Call the office at [phone] (after-hours you will be directed to the urologist on call) if you develop a fever > 101°F, chills, redness and drainage from your incisions, chest pain, difficulty breathing, difficulty urinating, significant abdominal pain, vomiting, or leg swelling or pain in the next few weeks.      Saira Bean PA-C  [facility]  2/6/2025

## 2025-02-06 NOTE — PROGRESS NOTES
NURSING DISCHARGE NOTE    Discharged Home via Wheelchair.  Accompanied by Support staff  Belongings Taken by patient/family.    Patient's VSS, tolerating diet, pain controlled, tolerating ambulating, Discharge education provided, reviewed medications and follow up appointments. All questions answered and concerns addressed, patient verbalized understanding. Patient discharged in stable condition.   Pt has follow-up tomorrow morning at 0915 for PVR in Dr. Smith's office.

## 2025-02-07 ENCOUNTER — PATIENT MESSAGE (OUTPATIENT)
Dept: SURGERY | Facility: CLINIC | Age: 70
End: 2025-02-07

## 2025-02-08 LAB
BLOOD TYPE BARCODE: 5100
UNIT VOLUME: 350 ML

## 2025-02-20 ENCOUNTER — OFFICE VISIT (OUTPATIENT)
Dept: SURGERY | Facility: CLINIC | Age: 70
End: 2025-02-20
Payer: MEDICARE

## 2025-02-20 DIAGNOSIS — Q62.39 UPJ OBSTRUCTION, CONGENITAL: Primary | ICD-10-CM

## 2025-02-20 DIAGNOSIS — R97.20 ELEVATED PSA: ICD-10-CM

## 2025-02-20 LAB
APPEARANCE: CLEAR
BILIRUBIN: NEGATIVE
GLUCOSE (URINE DIPSTICK): NEGATIVE MG/DL
KETONES (URINE DIPSTICK): NEGATIVE MG/DL
LEUKOCYTES: NEGATIVE
MULTISTIX LOT#: NORMAL NUMERIC
NITRITE, URINE: NEGATIVE
OCCULT BLOOD: NEGATIVE
PH, URINE: 6 (ref 4.5–8)
PROTEIN (URINE DIPSTICK): NEGATIVE MG/DL
SPECIFIC GRAVITY: 1.01 (ref 1–1.03)
URINE-COLOR: YELLOW
UROBILINOGEN,SEMI-QN: 0.2 MG/DL (ref 0–1.9)

## 2025-02-20 PROCEDURE — 99024 POSTOP FOLLOW-UP VISIT: CPT | Performed by: SURGERY

## 2025-02-20 PROCEDURE — 81003 URINALYSIS AUTO W/O SCOPE: CPT | Performed by: SURGERY

## 2025-02-20 NOTE — PROGRESS NOTES
Surgical pathology shows a benign, fibrotic kidney with chronic inflammation.    Future Appointments  4/29/2025  10:30 AM   Kavon Smith MD           [id number]           [facility]

## 2025-02-20 NOTE — PROGRESS NOTES
Urology Clinic Note    Primary Care Provider:  No primary care provider on file.     Chief Complaint:   BPH, hydronephrosis, atrophic kidney     HPI & Assessment:   William Dhillon is a 70 year old male with history of OA, hypertension referred for BPH/LUTS.  He was seen by a urologist in [place] back in 2021 and started on tamsulosin for BPH/LUTS.     He feels that the tamsulosin helps him and it helps even more when he was on tamsulosin 0.8 mg daily.  He has since run out of his prescription.  He denies bothersome urinary urgency, frequency, gross hematuria.     PVR at prior urology visits around 200 mL.  Up to 335 mL last visit.  I restarted tamsulosin 0.8 mg daily last visit.  I also treated him for an E. coli UTI on 3/26/2024.     PSA on 9/23/2024 was 4.2 (13.8% free). No priors for comparison.  Renal ultrasound 8/27/2024 showed marked right hydronephrosis with cortical thinning, left extrarenal pelvis, no stones, bladder  cc. Prostate volume 74.2 cc.  I ordered a follow-up CT urogram performed on 9/23/2024 and this showed severe right hydronephrosis with cortical thinning likely related to chronic UPJ obstruction.  Mild left hydronephrosis is also noted suggesting mild left UPJ obstruction.  He has an enlarged prostate with mild bladder thickening.  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     He is not having any significant right flank pain at this time.  He does note occasional right back pain that could be related that he has had over his lifetime.  He endorses good urinary stream and his PVR is slightly improved today.  Started on finasteride 9/24/2024.     Lasix renal scan 10/7/2024 showed split function 74% left, 26% right (though it does not take up much radiotracer at all and I think this may be an overestimate based on how his kidney looks on CT). T1/2 after Lasix 4.8 min left, no significant  excretion from the right kidney consistent with high grade obstruction.     I brought him to the OR on 2/5/2025 for robotic right radical nephrectomy.  He did have a lower pole crossing artery and vein causing UPJ obstruction.  He did well and was discharged on postoperative day 1.    Pathology report still pending.    Feeling well today.  Pain controlled, normal bowel movements, normal diet.  Good urinary stream on tamsulosin and finasteride.    Plan:   -PSA, BMP in 3 months  -Continue tamsulosin and finasteride  -Renal ultrasound in about 6 weeks to ensure no left hydronephrosis       PSA:  Lab Results   Component Value Date    PSA 4.2 (H) 09/23/2024        History:     Past Medical History:    Cancer (HCC)    WRIST- BASAL CELL    Renal mass    SURGERY 2/5/25 Right Radical Nephrectomy       Past Surgical History:   Procedure Laterality Date    Cataract  2023    Colonoscopy  2023    Results were good    Other surgical history      5/2024 WRIST MOH'S PROCEDURE       No family history on file.    Social History     Socioeconomic History    Marital status:    Tobacco Use    Smoking status: Never    Smokeless tobacco: Never   Vaping Use    Vaping status: Never Used   Substance and Sexual Activity    Alcohol use: Yes     Alcohol/week: 6.0 standard drinks of alcohol     Types: 6 Cans of beer per week     Comment: 2X WEEKLY    Drug use: Never   Other Topics Concern    Grew up on a farm No    History of tanning No    Outdoor occupation No    Reaction to local anesthetic No    Pt has a pacemaker No    Pt has a defibrillator No     Social Drivers of Health     Food Insecurity: No Food Insecurity (2/5/2025)    NCSS - Food Insecurity     Worried About Running Out of Food in the Last Year: No     Ran Out of Food in the Last Year: No   Transportation Needs: No Transportation Needs (2/5/2025)    NCSS - Transportation     Lack of Transportation: No   Housing Stability: Not At Risk (2/5/2025)    NCSS - Housing/Utilities      Has Housing: Yes     Worried About Losing Housing: No     Unable to Get Utilities: No       Medications (Active prior to today's visit):  Current Outpatient Medications   Medication Sig Dispense Refill    HYDROcodone-acetaminophen (NORCO) 5-325 MG Oral Tab Take 1 tablet by mouth every 6 (six) hours as needed for Pain (For pain not controlled by tylenol or ibuprofen.). This contains tylenol - do not exceed 4 g of tylenol daily. 10 tablet 0    Polyethylene Glycol 3350 (MIRALAX) 17 g Oral Powd Pack Take 17 g by mouth daily as needed (Take to avoid constipation, especially if taking narcotic pain medications.). 20 packet 1    Glucosamine-Chondroit-Biofl-Mn (GLUCOSAMINE CHONDROIT,BIOFLAV, OR) Take 1 tablet by mouth before breakfast.      finasteride 5 MG Oral Tab Take 1 tablet (5 mg total) by mouth daily. (Patient taking differently: Take 1 tablet (5 mg total) by mouth every evening.) 90 tablet 6    tamsulosin 0.4 MG Oral Cap Take 2 capsules (0.8 mg total) by mouth every evening. 180 capsule 6    multivitamin Oral Chew Tab Chew 1 tablet by mouth daily.         Allergies:  Allergies[1]    Review of Systems:   A comprehensive 10-point review of systems was completed.  Pertinent positives and negatives are noted in the the HPI.    Physical Exam:   CONSTITUTIONAL: Well developed, well nourished, in no acute distress  NEUROLOGIC: Alert and oriented  HEAD: Normocephalic, atraumatic  EYES: Sclera non-icteric  ENT: Hearing intact, moist mucous membranes  NECK: No obvious goiter or masses  RESPIRATORY: Normal respiratory effort  SKIN: No evident rashes  ABDOMEN: Soft, non-tender, non-distended, well-healing lap incisions      In total, 20 minutes were spent on this patient encounter (including chart review, patient history, physical, counseling, documentation, and communication).    Kavon Smith MD  Staff Urologist  [facility]  Office: [phone]             [1] No Known Allergies

## 2025-02-24 ENCOUNTER — TELEPHONE (OUTPATIENT)
Dept: SURGERY | Facility: CLINIC | Age: 70
End: 2025-02-24

## 2025-02-24 NOTE — TELEPHONE ENCOUNTER
Per patient calling stating he was instructed to schedule a CT or Ultrasound scan before his appointment with Dr. Smith on 4/29/2025, but central scheduling told patient that they can only schedule patient for 3/20/2025 or before. Please call back.

## 2025-02-25 NOTE — TELEPHONE ENCOUNTER
This encounter is now closed.     RN called patient. Clarified order of US - good starting 3/20/25 and it expires 2/20/26. Patient verbalized understanding and will call back Central Scheduling.     US KIDNEY/BLADDER (CPT=76770) ([id number]) [[id number]] (Order [id number])  Date: 2/20/2025 Department: [facility] Ordering/Authorizing: Kavon Smith MD      Future Order Information  Expected Expires      3/20/2025 (Approximate) 2/20/2026

## 2025-04-22 ENCOUNTER — HOSPITAL ENCOUNTER (OUTPATIENT)
Dept: ULTRASOUND IMAGING | Age: 70
Discharge: HOME | End: 2025-04-22
Attending: SURGERY
Payer: MEDICARE

## 2025-04-22 DIAGNOSIS — Q62.39 UPJ OBSTRUCTION, CONGENITAL: ICD-10-CM

## 2025-04-22 PROCEDURE — 76770 US EXAM ABDO BACK WALL COMP: CPT | Performed by: SURGERY

## 2025-04-22 NOTE — PROGRESS NOTES
Renal ultrasound 4/22/2025 shows mild to moderate left hydronephrosis, 1.3 cm simple cyst in the left kidney.    Given new solitary kidney status we will have to monitor renal function closely, this is mostly stable mild hydronephrosis from previously.    Future Appointments  4/29/2025  10:30 AM   Kavon Smith MD           [id number]           [facility]

## 2025-04-29 ENCOUNTER — LAB ENCOUNTER (OUTPATIENT)
Dept: LAB | Age: 70
End: 2025-04-29
Attending: SURGERY
Payer: MEDICARE

## 2025-04-29 ENCOUNTER — OFFICE VISIT (OUTPATIENT)
Dept: SURGERY | Facility: CLINIC | Age: 70
End: 2025-04-29
Payer: MEDICARE

## 2025-04-29 DIAGNOSIS — R97.20 ELEVATED PSA: Primary | ICD-10-CM

## 2025-04-29 DIAGNOSIS — N40.1 BPH LOC W URIN OBS/LUTS: ICD-10-CM

## 2025-04-29 DIAGNOSIS — R97.20 ELEVATED PSA: ICD-10-CM

## 2025-04-29 DIAGNOSIS — Q62.39 UPJ OBSTRUCTION, CONGENITAL: ICD-10-CM

## 2025-04-29 DIAGNOSIS — N13.39 OTHER HYDRONEPHROSIS: ICD-10-CM

## 2025-04-29 LAB
ANION GAP SERPL CALC-SCNC: 7 MMOL/L (ref 0–18)
BUN BLD-MCNC: 17 MG/DL (ref 9–23)
CALCIUM BLD-MCNC: 10.3 MG/DL (ref 8.7–10.6)
CHLORIDE SERPL-SCNC: 107 MMOL/L (ref 98–112)
CO2 SERPL-SCNC: 26 MMOL/L (ref 21–32)
CREAT BLD-MCNC: 1.11 MG/DL (ref 0.7–1.3)
EGFRCR SERPLBLD CKD-EPI 2021: 72 ML/MIN/1.73M2 (ref 60–?)
FASTING STATUS PATIENT QL REPORTED: NO
GLUCOSE BLD-MCNC: 90 MG/DL (ref 70–99)
OSMOLALITY SERPL CALC.SUM OF ELEC: 291 MOSM/KG (ref 275–295)
POTASSIUM SERPL-SCNC: 4.6 MMOL/L (ref 3.5–5.1)
PSA SERPL-MCNC: 2.37 NG/ML (ref ?–4)
SODIUM SERPL-SCNC: 140 MMOL/L (ref 136–145)

## 2025-04-29 PROCEDURE — 84153 ASSAY OF PSA TOTAL: CPT

## 2025-04-29 PROCEDURE — 80048 BASIC METABOLIC PNL TOTAL CA: CPT

## 2025-04-29 PROCEDURE — 99024 POSTOP FOLLOW-UP VISIT: CPT | Performed by: SURGERY

## 2025-04-29 PROCEDURE — 36415 COLL VENOUS BLD VENIPUNCTURE: CPT

## 2025-04-29 RX ORDER — FINASTERIDE 5 MG/1
5 TABLET, FILM COATED ORAL DAILY
Qty: 90 TABLET | Refills: 6 | Status: SHIPPED | OUTPATIENT
Start: 2025-04-29

## 2025-04-29 RX ORDER — TAMSULOSIN HYDROCHLORIDE 0.4 MG/1
0.8 CAPSULE ORAL EVERY EVENING
Qty: 180 CAPSULE | Refills: 6 | Status: SHIPPED | OUTPATIENT
Start: 2025-04-29

## 2025-04-29 NOTE — PROGRESS NOTES
PSA 2.37, creatinine 1.11.    Future Appointments  7/29/2025  10:45 AM   Kavon Smith MD           [id number]           [facility]

## 2025-04-29 NOTE — PROGRESS NOTES
Urology Clinic Note    Primary Care Provider:  No primary care provider on file.     Chief Complaint:   BPH, hydronephrosis, atrophic kidney      HPI & Assessment:   William Dhillon is a 70 year old male with history of OA, hypertension referred for BPH/LUTS.  He was seen by a urologist in [place] back in 2021 and started on tamsulosin for BPH/LUTS.     He feels that the tamsulosin helps him and it helps even more when he was on tamsulosin 0.8 mg daily.  He has since run out of his prescription.  He denies bothersome urinary urgency, frequency, gross hematuria.     PVR at prior urology visits around 200 mL.  Up to 335 mL last visit.  I restarted tamsulosin 0.8 mg daily last visit.  I also treated him for an E. coli UTI on 3/26/2024.     PSA on 9/23/2024 was 4.2 (13.8% free). No priors for comparison.  Renal ultrasound 8/27/2024 showed marked right hydronephrosis with cortical thinning, left extrarenal pelvis, no stones, bladder  cc. Prostate volume 74.2 cc.  I ordered a follow-up CT urogram performed on 9/23/2024 and this showed severe right hydronephrosis with cortical thinning likely related to chronic UPJ obstruction.  Mild left hydronephrosis is also noted suggesting mild left UPJ obstruction.  He has an enlarged prostate with mild bladder thickening.  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     He is not having any significant right flank pain at this time.  He does note occasional right back pain that could be related that he has had over his lifetime.  He endorses good urinary stream and his PVR is slightly improved today.  Started on finasteride 9/24/2024.     Lasix renal scan 10/7/2024 showed split function 74% left, 26% right (though it does not take up much radiotracer at all and I think this may be an overestimate based on how his kidney looks on CT). T1/2 after Lasix 4.8 min left, no significant  excretion from the right kidney consistent with high grade obstruction.     I brought him to the OR on 2/5/2025 for robotic right radical nephrectomy.  He did have a lower pole crossing artery and vein causing UPJ obstruction.  He did well and was discharged on postoperative day 1.    Surgical pathology showed a benign, fibrotic kidney with chronic inflammation     Renal ultrasound 4/22/2025 shows mild to moderate left hydronephrosis, 1.3 cm simple cyst in the left kidney.     Given new solitary kidney status we will have to monitor renal function closely, this is mostly stable mild hydronephrosis from previously.    Doing well at this time.  No pain.  Nocturia just once per night and voiding symptoms well-controlled    AUA symptom score is 3/1 with LUTS.    Urinalysis: Negative    Plan:   - BMP now, then again in 3 months  - Renal ultrasound in 3 months  - Repeat PSA  - Continue tamsulosin and finasteride       PSA:  Lab Results   Component Value Date    PSA 4.2 (H) 09/23/2024        History:   Past Medical History[1]    Past Surgical History[2]    Family History[3]    Short Social Hx on File[4]    Medications (Active prior to today's visit):  Current Medications[5]    Allergies:  Allergies[6]    Review of Systems:   A comprehensive 10-point review of systems was completed.  Pertinent positives and negatives are noted in the the HPI.    Physical Exam:   CONSTITUTIONAL: Well developed, well nourished, in no acute distress  NEUROLOGIC: Alert and oriented  HEAD: Normocephalic, atraumatic  EYES: Sclera non-icteric  ENT: Hearing intact, moist mucous membranes  NECK: No obvious goiter or masses  RESPIRATORY: Normal respiratory effort  SKIN: No evident rashes  ABDOMEN: Soft, non-tender, non-distended, well-healed lap incisions      In total, 20 minutes were spent on this patient encounter (including chart review, patient history, physical, counseling, documentation, and communication).    Kavon Smith MD  Staff  Urologist  [facility]  Office: [phone]             [1]   Past Medical History:   Cancer (HCC)    WRIST- BASAL CELL    Renal mass    SURGERY 2/5/25 Right Radical Nephrectomy   [2]   Past Surgical History:  Procedure Laterality Date    Cataract  2023    Colonoscopy  2023    Results were good    Other surgical history      5/2024 WRIST MOH'S PROCEDURE   [3] No family history on file.  [4]   Social History  Socioeconomic History    Marital status:    Tobacco Use    Smoking status: Never    Smokeless tobacco: Never   Vaping Use    Vaping status: Never Used   Substance and Sexual Activity    Alcohol use: Yes     Alcohol/week: 6.0 standard drinks of alcohol     Types: 6 Cans of beer per week     Comment: 2X WEEKLY    Drug use: Never   Other Topics Concern    Grew up on a farm No    History of tanning No    Outdoor occupation No    Reaction to local anesthetic No    Pt has a pacemaker No    Pt has a defibrillator No     Social Drivers of Health     Food Insecurity: No Food Insecurity (2/5/2025)    NCSS - Food Insecurity     Worried About Running Out of Food in the Last Year: No     Ran Out of Food in the Last Year: No   Transportation Needs: No Transportation Needs (2/5/2025)    NCSS - Transportation     Lack of Transportation: No   Housing Stability: Not At Risk (2/5/2025)    NCSS - Housing/Utilities     Has Housing: Yes     Worried About Losing Housing: No     Unable to Get Utilities: No   [5]   Current Outpatient Medications   Medication Sig Dispense Refill    HYDROcodone-acetaminophen (NORCO) 5-325 MG Oral Tab Take 1 tablet by mouth every 6 (six) hours as needed for Pain (For pain not controlled by tylenol or ibuprofen.). This contains tylenol - do not exceed 4 g of tylenol daily. 10 tablet 0    Polyethylene Glycol 3350 (MIRALAX) 17 g Oral Powd Pack Take 17 g by mouth daily as needed (Take to avoid constipation, especially if taking narcotic pain medications.). 20 packet 1     Glucosamine-Chondroit-Biofl-Mn (GLUCOSAMINE CHONDROIT,BIOFLAV, OR) Take 1 tablet by mouth before breakfast.      finasteride 5 MG Oral Tab Take 1 tablet (5 mg total) by mouth daily. (Patient taking differently: Take 1 tablet (5 mg total) by mouth every evening.) 90 tablet 6    tamsulosin 0.4 MG Oral Cap Take 2 capsules (0.8 mg total) by mouth every evening. 180 capsule 6    multivitamin Oral Chew Tab Chew 1 tablet by mouth daily.     [6] No Known Allergies

## 2025-06-10 ENCOUNTER — TELEPHONE (OUTPATIENT)
Dept: SURGERY | Facility: CLINIC | Age: 70
End: 2025-06-10

## 2025-06-10 NOTE — TELEPHONE ENCOUNTER
Mychart msg was sent to call the office for assistance in rescheduling his appointment for 7/29/2025 due to MD schedule change

## (undated) DEVICE — GLOVE SUR 7 SENSICARE PI PIP CRM PWD F

## (undated) DEVICE — SOLUTION IV 1000ML DIL ST H2O

## (undated) DEVICE — COLUMN DRAPE

## (undated) DEVICE — SUT PDS II 1 36IN ABSRB VLT L36MM CT-1

## (undated) DEVICE — SYRINGE BULB 50/CS 48/PLT: Brand: MEDEGEN MEDICAL PRODUCTS, LLC

## (undated) DEVICE — DAVINCI XI CLIP HEM O LOK LARGE PURPLE

## (undated) DEVICE — VIOLET BRAIDED (POLYGLACTIN 910), SYNTHETIC ABSORBABLE SUTURE: Brand: COATED VICRYL

## (undated) DEVICE — SUREFORM 45 RELOAD WHITE: Brand: SUREFORM

## (undated) DEVICE — SUREFORM 45: Brand: SUREFORM

## (undated) DEVICE — FENESTRATED BIPOLAR FORCEPS: Brand: ENDOWRIST

## (undated) DEVICE — ADHESIVE SKIN TOP FOR WND CLSR DERMBND ADV

## (undated) DEVICE — LAPAROVUE VISIBILITY SYSTEM LAPAROSCOPIC SOLUTIONS: Brand: LAPAROVUE

## (undated) DEVICE — SUT MCRYL 4-0 18IN PS-2 ABSRB UD 19MM 3/8 CIR

## (undated) DEVICE — AIRSEAL TRI-LUMEN LILTERED TUBE SET: Brand: AIRSEAL

## (undated) DEVICE — TIP-UP FENESTRATED GRASPER: Brand: ENDOWRIST

## (undated) DEVICE — ABSORBABLE HEMOSTAT (OXIDIZED REGENERATED CELLULOSE): Brand: SURGICEL NU-KNIT

## (undated) DEVICE — GLOVE SUR 6 SENSICARE PI PIP CRM PWD F

## (undated) DEVICE — COVER,LIGHT,CAMERA,HARD,1/PK,STRL: Brand: MEDLINE

## (undated) DEVICE — VESSEL SEALER EXTEND: Brand: ENDOWRIST

## (undated) DEVICE — BLADELESS OBTURATOR: Brand: WECK VISTA

## (undated) DEVICE — SEAL

## (undated) DEVICE — ROBOTIC GENERAL: Brand: MEDLINE INDUSTRIES, INC.

## (undated) DEVICE — #15 STERILE STAINLESS BLADE: Brand: STERILE STAINLESS BLADES

## (undated) DEVICE — GLOVE SUR 7.5 SENSICARE PI PIP GRN PWD F

## (undated) DEVICE — ANTIBACTERIAL UNDYED BRAIDED (POLYGLACTIN 910), SYNTHETIC ABSORBABLE SUTURE: Brand: COATED VICRYL

## (undated) DEVICE — AIRSEAL 12 MM ACCESS PORT AND PALM GRIP OBTURATOR WITH BLADELESS OPTICAL TIP, 120 MM LENGTH: Brand: AIRSEAL

## (undated) DEVICE — TISSUE RETRIEVAL SYSTEM: Brand: INZII RETRIEVAL SYSTEM

## (undated) DEVICE — TIP COVER ACCESSORY

## (undated) DEVICE — SUT COAT VCRL + 0 54IN ABSRB UD ANTIBACT

## (undated) DEVICE — TRAY CATH 16FR F INCL BARDX IC COMPLT CARE

## (undated) DEVICE — ARM DRAPE

## (undated) DEVICE — 2, DISPOSABLE SUCTION/IRRIGATOR WITHOUT DISPOSABLE TIP: Brand: STRYKEFLOW

## (undated) DEVICE — TROCAR: Brand: KII FIOS FIRST ENTRY

## (undated) DEVICE — MONOPOLAR CURVED SCISSORS: Brand: ENDOWRIST

## (undated) DEVICE — SUT COAT VCRL+ 0 27IN UR-6 ABSRB VLT ANTIBACT

## (undated) DEVICE — INSUFFLATION NEEDLE TO ESTABLISH PNEUMOPERITONEUM.: Brand: INSUFFLATION NEEDLE